# Patient Record
Sex: MALE | Race: WHITE | NOT HISPANIC OR LATINO | Employment: FULL TIME | URBAN - METROPOLITAN AREA
[De-identification: names, ages, dates, MRNs, and addresses within clinical notes are randomized per-mention and may not be internally consistent; named-entity substitution may affect disease eponyms.]

---

## 2017-06-29 ENCOUNTER — HOSPITAL ENCOUNTER (OUTPATIENT)
Dept: LAB | Facility: MEDICAL CENTER | Age: 62
End: 2017-06-29
Payer: COMMERCIAL

## 2017-06-29 LAB
BDY FAT % MEASURED: 30.5 %
BP DIAS: 107 MMHG
BP SYS: 139 MMHG
CHOLEST SERPL-MCNC: 144 MG/DL (ref 100–199)
DIABETES HTDIA: NO
EVENT NAME HTEVT: NORMAL
FASTING HTFAS: YES
GLUCOSE SERPL-MCNC: 87 MG/DL (ref 65–99)
HDLC SERPL-MCNC: 38 MG/DL
HYPERTENSION HTHYP: YES
LDLC SERPL CALC-MCNC: 74 MG/DL
SCREENING LOC CITY HTCIT: NORMAL
SCREENING LOC STATE HTSTA: NORMAL
SCREENING LOCATION HTLOC: NORMAL
SMOKING HTSMO: NO
SUBSCRIBER ID HTSID: NORMAL
TRIGL SERPL-MCNC: 159 MG/DL (ref 0–149)

## 2017-06-29 PROCEDURE — S5190 WELLNESS ASSESSMENT BY NONPH: HCPCS

## 2017-06-29 PROCEDURE — 80061 LIPID PANEL: CPT

## 2017-06-29 PROCEDURE — 82947 ASSAY GLUCOSE BLOOD QUANT: CPT

## 2017-06-29 PROCEDURE — 36415 COLL VENOUS BLD VENIPUNCTURE: CPT

## 2017-09-14 ENCOUNTER — EH NON-PROVIDER (OUTPATIENT)
Dept: OCCUPATIONAL MEDICINE | Facility: CLINIC | Age: 62
End: 2017-09-14

## 2017-09-14 DIAGNOSIS — Z23 FLU VACCINE NEED: Primary | ICD-10-CM

## 2017-09-14 PROCEDURE — 90686 IIV4 VACC NO PRSV 0.5 ML IM: CPT | Performed by: PREVENTIVE MEDICINE

## 2018-08-02 ENCOUNTER — HOSPITAL ENCOUNTER (OUTPATIENT)
Dept: LAB | Facility: MEDICAL CENTER | Age: 63
End: 2018-08-02
Payer: COMMERCIAL

## 2018-08-02 LAB
BDY FAT % MEASURED: 33.6 %
BP DIAS: 93 MMHG
BP SYS: 137 MMHG
CHOLEST SERPL-MCNC: 161 MG/DL (ref 100–199)
DIABETES HTDIA: NO
EVENT NAME HTEVT: NORMAL
FASTING HTFAS: YES
GLUCOSE SERPL-MCNC: 96 MG/DL (ref 65–99)
HDLC SERPL-MCNC: 39 MG/DL
HYPERTENSION HTHYP: YES
LDLC SERPL CALC-MCNC: 92 MG/DL
SCREENING LOC CITY HTCIT: NORMAL
SCREENING LOC STATE HTSTA: NORMAL
SCREENING LOCATION HTLOC: NORMAL
SMOKING HTSMO: NO
SUBSCRIBER ID HTSID: NORMAL
TRIGL SERPL-MCNC: 148 MG/DL (ref 0–149)

## 2018-08-02 PROCEDURE — 36415 COLL VENOUS BLD VENIPUNCTURE: CPT

## 2018-08-02 PROCEDURE — 80061 LIPID PANEL: CPT

## 2018-08-02 PROCEDURE — S5190 WELLNESS ASSESSMENT BY NONPH: HCPCS

## 2018-08-02 PROCEDURE — 82947 ASSAY GLUCOSE BLOOD QUANT: CPT

## 2018-10-04 ENCOUNTER — IMMUNIZATION (OUTPATIENT)
Dept: SOCIAL WORK | Facility: CLINIC | Age: 63
End: 2018-10-04

## 2018-10-04 DIAGNOSIS — Z23 NEED FOR VACCINATION: ICD-10-CM

## 2018-10-04 PROCEDURE — 90686 IIV4 VACC NO PRSV 0.5 ML IM: CPT | Performed by: REGISTERED NURSE

## 2018-11-01 ENCOUNTER — OFFICE VISIT (OUTPATIENT)
Dept: URGENT CARE | Facility: MEDICAL CENTER | Age: 63
End: 2018-11-01
Payer: COMMERCIAL

## 2018-11-01 VITALS
HEART RATE: 65 BPM | OXYGEN SATURATION: 95 % | DIASTOLIC BLOOD PRESSURE: 82 MMHG | WEIGHT: 247.6 LBS | HEIGHT: 74 IN | TEMPERATURE: 97.6 F | SYSTOLIC BLOOD PRESSURE: 158 MMHG | BODY MASS INDEX: 31.78 KG/M2

## 2018-11-01 DIAGNOSIS — R21 RASH: ICD-10-CM

## 2018-11-01 PROCEDURE — 99214 OFFICE O/P EST MOD 30 MIN: CPT | Performed by: FAMILY MEDICINE

## 2018-11-01 RX ORDER — VALACYCLOVIR HYDROCHLORIDE 1 G/1
1000 TABLET, FILM COATED ORAL 3 TIMES DAILY
Qty: 21 TAB | Refills: 1 | Status: SHIPPED | OUTPATIENT
Start: 2018-11-01 | End: 2018-11-08

## 2018-11-01 RX ORDER — ACYCLOVIR 800 MG/1
800 TABLET ORAL 2 TIMES DAILY
COMMUNITY
End: 2018-11-01

## 2018-11-01 NOTE — PROGRESS NOTES
"Subjective:      Tank Morse is a 63 y.o. male who presents with Herpes Zoster (middle lip, 2 hours ago)    - This is a very pleasant, well and non-toxic appearing 63 y.o. male with complaints of \"shingles\" attack coming on. Says he gets them once a year. Usually on upper lip but says it may spread to whole side face sometimes.           ALLERGIES:  Patient has no allergy information on record.     PMH:  No past medical history on file.     MEDS:    Current Outpatient Prescriptions:   •  valacyclovir (VALTREX) 1 GM Tab, Take 1 Tab by mouth 3 times a day for 7 days., Disp: 21 Tab, Rfl: 1  •  simvastatin (ZOCOR) 10 MG Tab, Take 10 mg by mouth every evening., Disp: , Rfl:   •  losartan (COZAAR) 25 MG Tab, Take 25 mg by mouth every day., Disp: , Rfl:     ** I have documented what I find to be significant in regards to past medical, social, family and surgical history  in my HPI or under PMH/PSH/FH review section, otherwise it is contributory **             HPI    Review of Systems   All other systems reviewed and are negative.         Objective:     /82   Pulse 65   Temp 36.4 °C (97.6 °F)   Ht 1.88 m (6' 2\")   Wt 112.3 kg (247 lb 9.6 oz)   SpO2 95%   BMI 31.79 kg/m²      Physical Exam   Constitutional: He appears well-developed. No distress.   HENT:   Head: Normocephalic and atraumatic.   Mouth/Throat: Oropharynx is clear and moist.   Eyes: Conjunctivae are normal.   Neck: Neck supple.   Cardiovascular: Regular rhythm.    No murmur heard.  Pulmonary/Chest: Effort normal. No respiratory distress.   Neurological: He is alert. He exhibits normal muscle tone.   Skin: Skin is warm and dry.   Psychiatric: He has a normal mood and affect. Judgment normal.   Nursing note and vitals reviewed.  Upper lip w/ small vesicle              Assessment/Plan:         1. Rash  valacyclovir (VALTREX) 1 GM Tab             Dx & d/c instructions discussed w/ patient and/or family members.     ER precautions (worsening " signs symptoms and when to go to ER) discussed.    Follow up w/ PCP in 2-3 days to make sure symptoms improving and no further intervention/treatment and/or work-up needed was advised, ER if feeling worse or not improving in 2 days.    Possible side effects (i.e. Rash, GI upset/constipation, sedation, elevation of BP or sugars) of any medications given discussed.     Patient left in stable condition

## 2018-12-18 ENCOUNTER — HOSPITAL ENCOUNTER (OUTPATIENT)
Dept: LAB | Facility: MEDICAL CENTER | Age: 63
End: 2018-12-18
Attending: NURSE PRACTITIONER
Payer: COMMERCIAL

## 2018-12-18 LAB
ALBUMIN SERPL BCP-MCNC: 4.3 G/DL (ref 3.2–4.9)
ALBUMIN/GLOB SERPL: 1.8 G/DL
ALP SERPL-CCNC: 124 U/L (ref 30–99)
ALT SERPL-CCNC: 18 U/L (ref 2–50)
ANION GAP SERPL CALC-SCNC: 8 MMOL/L (ref 0–11.9)
AST SERPL-CCNC: 16 U/L (ref 12–45)
BASOPHILS # BLD AUTO: 1 % (ref 0–1.8)
BASOPHILS # BLD: 0.05 K/UL (ref 0–0.12)
BILIRUB SERPL-MCNC: 1.4 MG/DL (ref 0.1–1.5)
BUN SERPL-MCNC: 16 MG/DL (ref 8–22)
CALCIUM SERPL-MCNC: 9.8 MG/DL (ref 8.5–10.5)
CHLORIDE SERPL-SCNC: 105 MMOL/L (ref 96–112)
CHOLEST SERPL-MCNC: 187 MG/DL (ref 100–199)
CO2 SERPL-SCNC: 28 MMOL/L (ref 20–33)
CREAT SERPL-MCNC: 1.13 MG/DL (ref 0.5–1.4)
EOSINOPHIL # BLD AUTO: 0.13 K/UL (ref 0–0.51)
EOSINOPHIL NFR BLD: 2.5 % (ref 0–6.9)
ERYTHROCYTE [DISTWIDTH] IN BLOOD BY AUTOMATED COUNT: 41.1 FL (ref 35.9–50)
GLOBULIN SER CALC-MCNC: 2.4 G/DL (ref 1.9–3.5)
GLUCOSE SERPL-MCNC: 100 MG/DL (ref 65–99)
HCT VFR BLD AUTO: 47.4 % (ref 42–52)
HDLC SERPL-MCNC: 45 MG/DL
HGB BLD-MCNC: 16.3 G/DL (ref 14–18)
IMM GRANULOCYTES # BLD AUTO: 0.01 K/UL (ref 0–0.11)
IMM GRANULOCYTES NFR BLD AUTO: 0.2 % (ref 0–0.9)
LDLC SERPL CALC-MCNC: 115 MG/DL
LYMPHOCYTES # BLD AUTO: 1.18 K/UL (ref 1–4.8)
LYMPHOCYTES NFR BLD: 22.4 % (ref 22–41)
MCH RBC QN AUTO: 31.2 PG (ref 27–33)
MCHC RBC AUTO-ENTMCNC: 34.4 G/DL (ref 33.7–35.3)
MCV RBC AUTO: 90.8 FL (ref 81.4–97.8)
MONOCYTES # BLD AUTO: 0.48 K/UL (ref 0–0.85)
MONOCYTES NFR BLD AUTO: 9.1 % (ref 0–13.4)
NEUTROPHILS # BLD AUTO: 3.41 K/UL (ref 1.82–7.42)
NEUTROPHILS NFR BLD: 64.8 % (ref 44–72)
NRBC # BLD AUTO: 0 K/UL
NRBC BLD-RTO: 0 /100 WBC
PLATELET # BLD AUTO: 225 K/UL (ref 164–446)
PMV BLD AUTO: 10 FL (ref 9–12.9)
POTASSIUM SERPL-SCNC: 4 MMOL/L (ref 3.6–5.5)
PROT SERPL-MCNC: 6.7 G/DL (ref 6–8.2)
RBC # BLD AUTO: 5.22 M/UL (ref 4.7–6.1)
SODIUM SERPL-SCNC: 141 MMOL/L (ref 135–145)
TRIGL SERPL-MCNC: 137 MG/DL (ref 0–149)
TSH SERPL DL<=0.005 MIU/L-ACNC: 1.7 UIU/ML (ref 0.38–5.33)
WBC # BLD AUTO: 5.3 K/UL (ref 4.8–10.8)

## 2018-12-18 PROCEDURE — 84443 ASSAY THYROID STIM HORMONE: CPT

## 2018-12-18 PROCEDURE — 84153 ASSAY OF PSA TOTAL: CPT

## 2018-12-18 PROCEDURE — 85025 COMPLETE CBC W/AUTO DIFF WBC: CPT

## 2018-12-18 PROCEDURE — 36415 COLL VENOUS BLD VENIPUNCTURE: CPT

## 2018-12-18 PROCEDURE — 80061 LIPID PANEL: CPT

## 2018-12-18 PROCEDURE — 80053 COMPREHEN METABOLIC PANEL: CPT

## 2018-12-20 LAB
PSA FREE MFR SERPL: NORMAL %
PSA FREE SERPL-MCNC: NORMAL NG/ML
PSA SERPL-MCNC: 2.8 NG/ML (ref 0–4)

## 2019-06-12 ENCOUNTER — HOSPITAL ENCOUNTER (OUTPATIENT)
Dept: LAB | Facility: MEDICAL CENTER | Age: 64
End: 2019-06-12
Payer: COMMERCIAL

## 2019-06-12 LAB
BDY FAT % MEASURED: 30.7 %
BP DIAS: 96 MMHG
BP SYS: 152 MMHG
CHOLEST SERPL-MCNC: 121 MG/DL (ref 100–199)
DIABETES HTDIA: NO
EVENT NAME HTEVT: NORMAL
FASTING HTFAS: YES
GLUCOSE SERPL-MCNC: 86 MG/DL (ref 65–99)
HDLC SERPL-MCNC: 38 MG/DL
HYPERTENSION HTHYP: YES
LDLC SERPL CALC-MCNC: 65 MG/DL
SCREENING LOC CITY HTCIT: NORMAL
SCREENING LOC STATE HTSTA: NORMAL
SCREENING LOCATION HTLOC: NORMAL
SMOKING HTSMO: NO
SUBSCRIBER ID HTSID: NORMAL
TRIGL SERPL-MCNC: 91 MG/DL (ref 0–149)

## 2019-06-12 PROCEDURE — 80061 LIPID PANEL: CPT

## 2019-06-12 PROCEDURE — 36415 COLL VENOUS BLD VENIPUNCTURE: CPT

## 2019-06-12 PROCEDURE — 82947 ASSAY GLUCOSE BLOOD QUANT: CPT

## 2019-06-12 PROCEDURE — S5190 WELLNESS ASSESSMENT BY NONPH: HCPCS

## 2019-10-11 ENCOUNTER — IMMUNIZATION (OUTPATIENT)
Dept: SOCIAL WORK | Facility: CLINIC | Age: 64
End: 2019-10-11

## 2019-10-11 DIAGNOSIS — Z23 NEED FOR VACCINATION: ICD-10-CM

## 2019-10-11 PROCEDURE — 90686 IIV4 VACC NO PRSV 0.5 ML IM: CPT | Performed by: REGISTERED NURSE

## 2019-12-17 ENCOUNTER — HOSPITAL ENCOUNTER (OUTPATIENT)
Dept: LAB | Facility: MEDICAL CENTER | Age: 64
End: 2019-12-17
Attending: NURSE PRACTITIONER
Payer: COMMERCIAL

## 2019-12-17 LAB
ALBUMIN SERPL BCP-MCNC: 4.1 G/DL (ref 3.2–4.9)
ALBUMIN/GLOB SERPL: 1.5 G/DL
ALP SERPL-CCNC: 100 U/L (ref 30–99)
ALT SERPL-CCNC: 17 U/L (ref 2–50)
ANION GAP SERPL CALC-SCNC: 6 MMOL/L (ref 0–11.9)
AST SERPL-CCNC: 18 U/L (ref 12–45)
BASOPHILS # BLD AUTO: 0.8 % (ref 0–1.8)
BASOPHILS # BLD: 0.06 K/UL (ref 0–0.12)
BILIRUB SERPL-MCNC: 1.3 MG/DL (ref 0.1–1.5)
BUN SERPL-MCNC: 18 MG/DL (ref 8–22)
CALCIUM SERPL-MCNC: 9 MG/DL (ref 8.5–10.5)
CHLORIDE SERPL-SCNC: 107 MMOL/L (ref 96–112)
CHOLEST SERPL-MCNC: 170 MG/DL (ref 100–199)
CO2 SERPL-SCNC: 30 MMOL/L (ref 20–33)
CREAT SERPL-MCNC: 1.02 MG/DL (ref 0.5–1.4)
EOSINOPHIL # BLD AUTO: 0.17 K/UL (ref 0–0.51)
EOSINOPHIL NFR BLD: 2.3 % (ref 0–6.9)
ERYTHROCYTE [DISTWIDTH] IN BLOOD BY AUTOMATED COUNT: 42.3 FL (ref 35.9–50)
FASTING STATUS PATIENT QL REPORTED: NORMAL
GLOBULIN SER CALC-MCNC: 2.8 G/DL (ref 1.9–3.5)
GLUCOSE SERPL-MCNC: 92 MG/DL (ref 65–99)
HCT VFR BLD AUTO: 46.7 % (ref 42–52)
HDLC SERPL-MCNC: 46 MG/DL
HGB BLD-MCNC: 15.7 G/DL (ref 14–18)
IMM GRANULOCYTES # BLD AUTO: 0.01 K/UL (ref 0–0.11)
IMM GRANULOCYTES NFR BLD AUTO: 0.1 % (ref 0–0.9)
LDLC SERPL CALC-MCNC: 99 MG/DL
LYMPHOCYTES # BLD AUTO: 1.11 K/UL (ref 1–4.8)
LYMPHOCYTES NFR BLD: 14.7 % (ref 22–41)
MCH RBC QN AUTO: 31.3 PG (ref 27–33)
MCHC RBC AUTO-ENTMCNC: 33.6 G/DL (ref 33.7–35.3)
MCV RBC AUTO: 93 FL (ref 81.4–97.8)
MONOCYTES # BLD AUTO: 0.46 K/UL (ref 0–0.85)
MONOCYTES NFR BLD AUTO: 6.1 % (ref 0–13.4)
NEUTROPHILS # BLD AUTO: 5.74 K/UL (ref 1.82–7.42)
NEUTROPHILS NFR BLD: 76 % (ref 44–72)
NRBC # BLD AUTO: 0 K/UL
NRBC BLD-RTO: 0 /100 WBC
PLATELET # BLD AUTO: 194 K/UL (ref 164–446)
PMV BLD AUTO: 10.3 FL (ref 9–12.9)
POTASSIUM SERPL-SCNC: 4.1 MMOL/L (ref 3.6–5.5)
PROT SERPL-MCNC: 6.9 G/DL (ref 6–8.2)
RBC # BLD AUTO: 5.02 M/UL (ref 4.7–6.1)
SODIUM SERPL-SCNC: 143 MMOL/L (ref 135–145)
TRIGL SERPL-MCNC: 126 MG/DL (ref 0–149)
WBC # BLD AUTO: 7.6 K/UL (ref 4.8–10.8)

## 2019-12-17 PROCEDURE — 80053 COMPREHEN METABOLIC PANEL: CPT

## 2019-12-17 PROCEDURE — 84443 ASSAY THYROID STIM HORMONE: CPT

## 2019-12-17 PROCEDURE — 36415 COLL VENOUS BLD VENIPUNCTURE: CPT

## 2019-12-17 PROCEDURE — 80061 LIPID PANEL: CPT

## 2019-12-17 PROCEDURE — 85025 COMPLETE CBC W/AUTO DIFF WBC: CPT

## 2019-12-17 PROCEDURE — 84153 ASSAY OF PSA TOTAL: CPT

## 2019-12-18 LAB
PSA SERPL-MCNC: 3.07 NG/ML (ref 0–4)
TSH SERPL DL<=0.005 MIU/L-ACNC: 1.85 UIU/ML (ref 0.38–5.33)

## 2020-06-12 ENCOUNTER — HOSPITAL ENCOUNTER (OUTPATIENT)
Dept: LAB | Facility: MEDICAL CENTER | Age: 65
End: 2020-06-12
Payer: COMMERCIAL

## 2020-06-12 LAB
BDY FAT % MEASURED: 29.9 %
BP DIAS: 96 MMHG
BP SYS: 141 MMHG
CHOLEST SERPL-MCNC: 165 MG/DL (ref 100–199)
DIABETES HTDIA: NO
EVENT NAME HTEVT: NORMAL
FASTING HTFAS: YES
GLUCOSE SERPL-MCNC: 97 MG/DL (ref 65–99)
HDLC SERPL-MCNC: 47 MG/DL
HYPERTENSION HTHYP: YES
LDLC SERPL CALC-MCNC: 93 MG/DL
SCREENING LOC CITY HTCIT: NORMAL
SCREENING LOC STATE HTSTA: NORMAL
SCREENING LOCATION HTLOC: NORMAL
SMOKING HTSMO: NO
SUBSCRIBER ID HTSID: NORMAL
TRIGL SERPL-MCNC: 124 MG/DL (ref 0–149)

## 2020-06-12 PROCEDURE — 80061 LIPID PANEL: CPT

## 2020-06-12 PROCEDURE — 36415 COLL VENOUS BLD VENIPUNCTURE: CPT

## 2020-06-12 PROCEDURE — S5190 WELLNESS ASSESSMENT BY NONPH: HCPCS

## 2020-06-12 PROCEDURE — 82947 ASSAY GLUCOSE BLOOD QUANT: CPT

## 2020-09-16 ENCOUNTER — IMMUNIZATION (OUTPATIENT)
Dept: SOCIAL WORK | Facility: CLINIC | Age: 65
End: 2020-09-16
Payer: COMMERCIAL

## 2020-09-16 DIAGNOSIS — Z23 NEED FOR VACCINATION: ICD-10-CM

## 2020-09-16 PROCEDURE — 90686 IIV4 VACC NO PRSV 0.5 ML IM: CPT | Performed by: REGISTERED NURSE

## 2020-12-18 DIAGNOSIS — Z23 NEED FOR VACCINATION: ICD-10-CM

## 2020-12-20 ENCOUNTER — IMMUNIZATION (OUTPATIENT)
Dept: FAMILY PLANNING/WOMEN'S HEALTH CLINIC | Facility: IMMUNIZATION CENTER | Age: 65
End: 2020-12-20
Payer: COMMERCIAL

## 2020-12-20 ENCOUNTER — APPOINTMENT (OUTPATIENT)
Dept: FAMILY PLANNING/WOMEN'S HEALTH CLINIC | Facility: IMMUNIZATION CENTER | Age: 65
End: 2020-12-20
Attending: FAMILY MEDICINE
Payer: COMMERCIAL

## 2020-12-20 DIAGNOSIS — Z23 ENCOUNTER FOR VACCINATION: Primary | ICD-10-CM

## 2020-12-20 DIAGNOSIS — Z23 NEED FOR VACCINATION: ICD-10-CM

## 2020-12-20 PROCEDURE — 0001A PFIZER SARS-COV-2 VACCINE: CPT

## 2020-12-20 PROCEDURE — 91300 PFIZER SARS-COV-2 VACCINE: CPT

## 2021-01-10 ENCOUNTER — IMMUNIZATION (OUTPATIENT)
Dept: FAMILY PLANNING/WOMEN'S HEALTH CLINIC | Facility: IMMUNIZATION CENTER | Age: 66
End: 2021-01-10
Attending: FAMILY MEDICINE
Payer: COMMERCIAL

## 2021-01-10 DIAGNOSIS — Z23 ENCOUNTER FOR VACCINATION: Primary | ICD-10-CM

## 2021-01-10 PROCEDURE — 0002A PFIZER SARS-COV-2 VACCINE: CPT

## 2021-01-10 PROCEDURE — 91300 PFIZER SARS-COV-2 VACCINE: CPT

## 2021-03-03 ENCOUNTER — HOSPITAL ENCOUNTER (OUTPATIENT)
Dept: LAB | Facility: MEDICAL CENTER | Age: 66
End: 2021-03-03
Attending: NURSE PRACTITIONER
Payer: COMMERCIAL

## 2021-03-03 PROCEDURE — 36415 COLL VENOUS BLD VENIPUNCTURE: CPT

## 2021-03-03 PROCEDURE — 80061 LIPID PANEL: CPT

## 2021-03-03 PROCEDURE — 80053 COMPREHEN METABOLIC PANEL: CPT

## 2021-03-04 LAB
ALBUMIN SERPL BCP-MCNC: 4.3 G/DL (ref 3.2–4.9)
ALBUMIN/GLOB SERPL: 1.6 G/DL
ALP SERPL-CCNC: 124 U/L (ref 30–99)
ALT SERPL-CCNC: 20 U/L (ref 2–50)
ANION GAP SERPL CALC-SCNC: 10 MMOL/L (ref 7–16)
AST SERPL-CCNC: 20 U/L (ref 12–45)
BILIRUB SERPL-MCNC: 1.1 MG/DL (ref 0.1–1.5)
BUN SERPL-MCNC: 16 MG/DL (ref 8–22)
CALCIUM SERPL-MCNC: 9.6 MG/DL (ref 8.5–10.5)
CHLORIDE SERPL-SCNC: 101 MMOL/L (ref 96–112)
CHOLEST SERPL-MCNC: 156 MG/DL (ref 100–199)
CO2 SERPL-SCNC: 28 MMOL/L (ref 20–33)
CREAT SERPL-MCNC: 0.97 MG/DL (ref 0.5–1.4)
FASTING STATUS PATIENT QL REPORTED: NORMAL
GLOBULIN SER CALC-MCNC: 2.7 G/DL (ref 1.9–3.5)
GLUCOSE SERPL-MCNC: 97 MG/DL (ref 65–99)
HDLC SERPL-MCNC: 43 MG/DL
LDLC SERPL CALC-MCNC: 89 MG/DL
POTASSIUM SERPL-SCNC: 3.8 MMOL/L (ref 3.6–5.5)
PROT SERPL-MCNC: 7 G/DL (ref 6–8.2)
SODIUM SERPL-SCNC: 139 MMOL/L (ref 135–145)
TRIGL SERPL-MCNC: 121 MG/DL (ref 0–149)

## 2021-10-01 ENCOUNTER — IMMUNIZATION (OUTPATIENT)
Dept: OCCUPATIONAL MEDICINE | Facility: CLINIC | Age: 66
End: 2021-10-01
Payer: COMMERCIAL

## 2021-10-01 DIAGNOSIS — Z23 ENCOUNTER FOR VACCINATION: Primary | ICD-10-CM

## 2021-10-01 DIAGNOSIS — Z23 NEED FOR VACCINATION: Primary | ICD-10-CM

## 2021-10-01 PROCEDURE — 0003A PFIZER SARS-COV-2 VACCINE: CPT | Performed by: INTERNAL MEDICINE

## 2021-10-01 PROCEDURE — 90686 IIV4 VACC NO PRSV 0.5 ML IM: CPT | Performed by: NURSE PRACTITIONER

## 2021-10-01 PROCEDURE — 91300 PFIZER SARS-COV-2 VACCINE: CPT | Performed by: INTERNAL MEDICINE

## 2022-05-03 ENCOUNTER — OFFICE VISIT (OUTPATIENT)
Dept: MEDICAL GROUP | Facility: LAB | Age: 67
End: 2022-05-03
Payer: COMMERCIAL

## 2022-05-03 VITALS
DIASTOLIC BLOOD PRESSURE: 82 MMHG | BODY MASS INDEX: 31.65 KG/M2 | OXYGEN SATURATION: 96 % | SYSTOLIC BLOOD PRESSURE: 140 MMHG | TEMPERATURE: 97 F | RESPIRATION RATE: 14 BRPM | HEART RATE: 76 BPM | HEIGHT: 74 IN | WEIGHT: 246.6 LBS

## 2022-05-03 DIAGNOSIS — B00.1 RECURRENT COLD SORES: ICD-10-CM

## 2022-05-03 DIAGNOSIS — Z11.59 NEED FOR HEPATITIS C SCREENING TEST: ICD-10-CM

## 2022-05-03 DIAGNOSIS — Z23 NEED FOR VACCINATION: ICD-10-CM

## 2022-05-03 DIAGNOSIS — E78.5 HYPERLIPIDEMIA, UNSPECIFIED HYPERLIPIDEMIA TYPE: ICD-10-CM

## 2022-05-03 DIAGNOSIS — Z12.11 SCREENING FOR COLORECTAL CANCER: ICD-10-CM

## 2022-05-03 DIAGNOSIS — R06.83 SNORING: ICD-10-CM

## 2022-05-03 DIAGNOSIS — Z12.12 SCREENING FOR COLORECTAL CANCER: ICD-10-CM

## 2022-05-03 DIAGNOSIS — E55.9 VITAMIN D DEFICIENCY: ICD-10-CM

## 2022-05-03 DIAGNOSIS — I10 ESSENTIAL HYPERTENSION: ICD-10-CM

## 2022-05-03 PROCEDURE — 90471 IMMUNIZATION ADMIN: CPT | Performed by: NURSE PRACTITIONER

## 2022-05-03 PROCEDURE — 90715 TDAP VACCINE 7 YRS/> IM: CPT | Performed by: NURSE PRACTITIONER

## 2022-05-03 PROCEDURE — 99214 OFFICE O/P EST MOD 30 MIN: CPT | Mod: 25 | Performed by: NURSE PRACTITIONER

## 2022-05-03 RX ORDER — SIMVASTATIN 40 MG
40 TABLET ORAL NIGHTLY
Qty: 90 TABLET | Refills: 3 | Status: SHIPPED | OUTPATIENT
Start: 2022-05-03 | End: 2023-05-16

## 2022-05-03 RX ORDER — LOSARTAN POTASSIUM 25 MG/1
25 TABLET ORAL
Qty: 180 TABLET | Refills: 3 | Status: SHIPPED | OUTPATIENT
Start: 2022-05-03 | End: 2023-05-01

## 2022-05-03 RX ORDER — AMOXICILLIN 875 MG/1
TABLET, COATED ORAL
COMMUNITY
Start: 2022-04-29

## 2022-05-03 RX ORDER — ACYCLOVIR 800 MG/1
800 TABLET ORAL 2 TIMES DAILY
Qty: 20 TABLET | Refills: 3 | Status: SHIPPED | OUTPATIENT
Start: 2022-05-03

## 2022-05-03 RX ORDER — TRIAMCINOLONE ACETONIDE 5 MG/G
CREAM TOPICAL 3 TIMES DAILY
COMMUNITY
End: 2023-10-18 | Stop reason: SDUPTHER

## 2022-05-03 RX ORDER — ACYCLOVIR 800 MG/1
800 TABLET ORAL 2 TIMES DAILY
COMMUNITY
End: 2022-05-03 | Stop reason: SDUPTHER

## 2022-05-03 ASSESSMENT — PATIENT HEALTH QUESTIONNAIRE - PHQ9: CLINICAL INTERPRETATION OF PHQ2 SCORE: 0

## 2022-05-03 NOTE — ASSESSMENT & PLAN NOTE
This is a chronic condition. Curently taking losartan 25 mg BID, denies side effects. Does not check blood pressure at home. Today's reading is 140/82. Denies chest pain, shortness of breath, headaches, dizziness/lightheadedness.

## 2022-05-03 NOTE — PROGRESS NOTES
"Subjective:     CC:    Chief Complaint   Patient presents with   • Establish Care   • Medication Refill     HISTORY OF THE PRESENT ILLNESS: Patient is a 66 y.o. male. This pleasant patient is here today to establish care and discuss the following:    Essential hypertension  This is a chronic condition. Curently taking losartan 25 mg BID, denies side effects. Does not check blood pressure at home. Today's reading is 140/82. Denies chest pain, shortness of breath, headaches, dizziness/lightheadedness.    Hyperlipidemia  Doing well. Reviewed labs with the patient. Taking medications as prescribed. No myalgias.    ROS:   Gen: no fevers/chills, no changes in weight  Eyes: no changes in vision  ENT: no sore throat, no hearing loss, no bloody nose  Pulm: no sob, no cough  CV: no chest pain, no palpitations  GI: no nausea/vomiting, no diarrhea  : no dysuria  MSk: no myalgias  Skin: no rash  Neuro: no headaches, no numbness/tingling  Heme/Lymph: no easy bruising        - NOTE: All other systems reviewed and are negative, except as in HPI.      Objective:     Exam: /82 (BP Location: Right arm, Patient Position: Sitting, BP Cuff Size: Adult)   Pulse 76   Temp 36.1 °C (97 °F)   Resp 14   Ht 1.88 m (6' 2\")   Wt 112 kg (246 lb 9.6 oz)   SpO2 96%  Body mass index is 31.66 kg/m².    Constitutional: Alert, no distress, well-groomed.  Skin: Warm, dry, good turgor, no rashes in visible areas.  Eye: Equal, round and reactive, conjunctiva clear, lids normal.  ENMT: Lips without lesions, good dentition, moist mucous membranes.  Neck: Trachea midline, no masses, no thyromegaly.  Respiratory: Unlabored respiratory effort, no cough. Clear to ausculation. No rales, ronchi, or wheezing.  Cardiovascular: Regular rate and rhythm without murmur. Carotid and radial pulses are intact and equal bilaterally.  MSK: Normal gait, moves all extremities.  Neuro: Grossly non-focal.   Psych: Alert and oriented x3, normal affect and " mood.    Assessment & Plan:   66 y.o. male with the following -    1. Essential hypertension  Well-controlled on current regimen.  Labs as indicated.  Continue antihypertensive medications.  Discussed decreasing salt intake.  Emphasized benefits of exercise and diet. Continue to monitor.  - CBC WITH DIFFERENTIAL; Future  - Comp Metabolic Panel; Future  - Lipid Profile; Future  - TSH WITH REFLEX TO FT4; Future  - VITAMIN D,25 HYDROXY; Future  - losartan (COZAAR) 25 MG Tab; Take 1 Tablet by mouth 2 (two) times a day.  Dispense: 180 Tablet; Refill: 3    2. Hyperlipidemia, unspecified hyperlipidemia type  Well controlled.  Labs as indicated.  Continue statin medication and lifestyle modifications.  Continue to monitor.  - CBC WITH DIFFERENTIAL; Future  - Comp Metabolic Panel; Future  - Lipid Profile; Future  - TSH WITH REFLEX TO FT4; Future  - VITAMIN D,25 HYDROXY; Future  - simvastatin (ZOCOR) 40 MG Tab; Take 1 Tablet by mouth every evening.  Dispense: 90 Tablet; Refill: 3    3. Vitamin D deficiency  Labs ordered.   - CBC WITH DIFFERENTIAL; Future  - Comp Metabolic Panel; Future  - Lipid Profile; Future  - TSH WITH REFLEX TO FT4; Future  - VITAMIN D,25 HYDROXY; Future    4. Recurrent cold sores  Chronic, stable condition. Medication refilled for future.   - acyclovir (ZOVIRAX) 800 MG Tab; Take 1 Tablet by mouth 2 times a day.  Dispense: 20 Tablet; Refill: 3    5. Need for vaccination  Patient was agreeable to receiving the tdap vaccine in clinic today after discussion of potential risks, benefits, and side effects. Vaccine was administered without adverse effects.  - Tdap =>8yo IM    6. Need for hepatitis C screening test  Labs ordered.   - HEP C VIRUS ANTIBODY; Future    7. Snoring  Sleep study ordered.   - Polysomnography, 4 or More; Future  - Referral to Pulmonary and Sleep Medicine    8. Screening for colorectal cancer  Colonoscopy ordered.   - Referral to GI for Colonoscopy    Please note that this dictation was  created using voice recognition software. I have made every reasonable attempt to correct obvious errors, but I expect that there are errors of grammar and possibly content that I did not discover before finalizing the note.

## 2022-05-24 ENCOUNTER — HOSPITAL ENCOUNTER (OUTPATIENT)
Dept: LAB | Facility: MEDICAL CENTER | Age: 67
End: 2022-05-24
Attending: NURSE PRACTITIONER
Payer: COMMERCIAL

## 2022-05-24 DIAGNOSIS — Z11.59 NEED FOR HEPATITIS C SCREENING TEST: ICD-10-CM

## 2022-05-24 DIAGNOSIS — E55.9 VITAMIN D DEFICIENCY: ICD-10-CM

## 2022-05-24 DIAGNOSIS — I10 ESSENTIAL HYPERTENSION: ICD-10-CM

## 2022-05-24 DIAGNOSIS — E78.5 HYPERLIPIDEMIA, UNSPECIFIED HYPERLIPIDEMIA TYPE: ICD-10-CM

## 2022-05-24 LAB
ALBUMIN SERPL BCP-MCNC: 4.3 G/DL (ref 3.2–4.9)
ALBUMIN/GLOB SERPL: 1.7 G/DL
ALP SERPL-CCNC: 127 U/L (ref 30–99)
ALT SERPL-CCNC: 17 U/L (ref 2–50)
ANION GAP SERPL CALC-SCNC: 9 MMOL/L (ref 7–16)
AST SERPL-CCNC: 22 U/L (ref 12–45)
BASOPHILS # BLD AUTO: 1.4 % (ref 0–1.8)
BASOPHILS # BLD: 0.06 K/UL (ref 0–0.12)
BILIRUB SERPL-MCNC: 1.1 MG/DL (ref 0.1–1.5)
BUN SERPL-MCNC: 11 MG/DL (ref 8–22)
CALCIUM SERPL-MCNC: 9.2 MG/DL (ref 8.5–10.5)
CHLORIDE SERPL-SCNC: 106 MMOL/L (ref 96–112)
CHOLEST SERPL-MCNC: 154 MG/DL (ref 100–199)
CO2 SERPL-SCNC: 27 MMOL/L (ref 20–33)
CREAT SERPL-MCNC: 1.07 MG/DL (ref 0.5–1.4)
EOSINOPHIL # BLD AUTO: 0.2 K/UL (ref 0–0.51)
EOSINOPHIL NFR BLD: 4.6 % (ref 0–6.9)
ERYTHROCYTE [DISTWIDTH] IN BLOOD BY AUTOMATED COUNT: 40.4 FL (ref 35.9–50)
FASTING STATUS PATIENT QL REPORTED: NORMAL
GFR SERPLBLD CREATININE-BSD FMLA CKD-EPI: 77 ML/MIN/1.73 M 2
GLOBULIN SER CALC-MCNC: 2.5 G/DL (ref 1.9–3.5)
GLUCOSE SERPL-MCNC: 95 MG/DL (ref 65–99)
HCT VFR BLD AUTO: 47.7 % (ref 42–52)
HCV AB SER QL: NORMAL
HDLC SERPL-MCNC: 39 MG/DL
HGB BLD-MCNC: 16.8 G/DL (ref 14–18)
IMM GRANULOCYTES # BLD AUTO: 0.01 K/UL (ref 0–0.11)
IMM GRANULOCYTES NFR BLD AUTO: 0.2 % (ref 0–0.9)
LDLC SERPL CALC-MCNC: 83 MG/DL
LYMPHOCYTES # BLD AUTO: 1.18 K/UL (ref 1–4.8)
LYMPHOCYTES NFR BLD: 27 % (ref 22–41)
MCH RBC QN AUTO: 31.8 PG (ref 27–33)
MCHC RBC AUTO-ENTMCNC: 35.2 G/DL (ref 33.7–35.3)
MCV RBC AUTO: 90.3 FL (ref 81.4–97.8)
MONOCYTES # BLD AUTO: 0.38 K/UL (ref 0–0.85)
MONOCYTES NFR BLD AUTO: 8.7 % (ref 0–13.4)
NEUTROPHILS # BLD AUTO: 2.54 K/UL (ref 1.82–7.42)
NEUTROPHILS NFR BLD: 58.1 % (ref 44–72)
NRBC # BLD AUTO: 0 K/UL
NRBC BLD-RTO: 0 /100 WBC
PLATELET # BLD AUTO: 238 K/UL (ref 164–446)
PMV BLD AUTO: 10 FL (ref 9–12.9)
POTASSIUM SERPL-SCNC: 3.9 MMOL/L (ref 3.6–5.5)
PROT SERPL-MCNC: 6.8 G/DL (ref 6–8.2)
RBC # BLD AUTO: 5.28 M/UL (ref 4.7–6.1)
SODIUM SERPL-SCNC: 142 MMOL/L (ref 135–145)
TRIGL SERPL-MCNC: 162 MG/DL (ref 0–149)
TSH SERPL DL<=0.005 MIU/L-ACNC: 2 UIU/ML (ref 0.38–5.33)
WBC # BLD AUTO: 4.4 K/UL (ref 4.8–10.8)

## 2022-05-24 PROCEDURE — 84443 ASSAY THYROID STIM HORMONE: CPT

## 2022-05-24 PROCEDURE — 85025 COMPLETE CBC W/AUTO DIFF WBC: CPT

## 2022-05-24 PROCEDURE — 80061 LIPID PANEL: CPT

## 2022-05-24 PROCEDURE — 36415 COLL VENOUS BLD VENIPUNCTURE: CPT

## 2022-05-24 PROCEDURE — 80053 COMPREHEN METABOLIC PANEL: CPT

## 2022-05-24 PROCEDURE — 82306 VITAMIN D 25 HYDROXY: CPT

## 2022-05-24 PROCEDURE — 86803 HEPATITIS C AB TEST: CPT

## 2022-05-26 LAB — 25(OH)D3 SERPL-MCNC: 23 NG/ML (ref 30–80)

## 2022-08-01 ENCOUNTER — OFFICE VISIT (OUTPATIENT)
Dept: SLEEP MEDICINE | Facility: MEDICAL CENTER | Age: 67
End: 2022-08-01
Payer: COMMERCIAL

## 2022-08-01 VITALS
SYSTOLIC BLOOD PRESSURE: 144 MMHG | HEART RATE: 64 BPM | DIASTOLIC BLOOD PRESSURE: 90 MMHG | OXYGEN SATURATION: 95 % | BODY MASS INDEX: 31.18 KG/M2 | RESPIRATION RATE: 16 BRPM | HEIGHT: 74 IN | WEIGHT: 243 LBS

## 2022-08-01 DIAGNOSIS — R06.83 LOUD SNORING: ICD-10-CM

## 2022-08-01 DIAGNOSIS — G47.30 SLEEP DISORDER BREATHING: Primary | ICD-10-CM

## 2022-08-01 DIAGNOSIS — F51.04 CHRONIC INSOMNIA: ICD-10-CM

## 2022-08-01 PROCEDURE — 99204 OFFICE O/P NEW MOD 45 MIN: CPT | Performed by: STUDENT IN AN ORGANIZED HEALTH CARE EDUCATION/TRAINING PROGRAM

## 2022-08-01 ASSESSMENT — FIBROSIS 4 INDEX: FIB4 SCORE: 1.46

## 2022-08-01 NOTE — PROGRESS NOTES
[facility] Consult Note     Date: 8/1/2022 / Time: 8:05 AM      Thank you for requesting a sleep medicine consultation on Tank Morse at the sleep center. Presents today with the chief complaints of snoring and awakening at night with trouble getting back to sleep. He is referred by SUMAN[facility] Che  [address] [place] [address] for evaluation and treatment of sleep disorder breathing .     HISTORY OF PRESENT ILLNESS:     Tank Morse is a 67 y.o. male with hypertension, hyperlipidemia, obesity and loud snoring.  Presents to Sleep Clinic for evaluation of snoring and trouble with awakening and getting back to sleep.    States he has had trouble with snoring most of his life.  However it has become louder over the years.  States he was snoring even when he was younger and normal weight.  He has slept with a wedge pillow more recently which she does find helpful at times.    His wife does not share a bed with him.  He has no trouble falling asleep but he does have trouble with awakening and getting back to sleep.  States he will wake up on average 2 times a night.  4-5 nights a week he will have difficulty getting back to sleep after taking entire hour.  He is not sleepy during the day.  Denies any choking or gasping in sleep.  He has been told in the past that he does have pauses in his breathing.  He does not nap during the day.    As per supplemental questionnaire to be scanned or imported into chart:    Harman Sleepiness Score: 6    Sleep Schedule  Bedtime: Weekday & Weekend 930-1030pm  Wake time: Weekday & Weekend 530-6am  Sleep-onset latency: 10  Awakenings from sleep: 2  Difficulty falling back asleep: yes, 4-5 times a week has trouble   Bedroom partner: none  Naps: No     DAYTIME SYMPTOMS:   Excessive daytime sleepiness: No   Daytime fatigue: No   Difficulty concentrating: Yes  Memory problems: No   Irritability:No   Work/school performance  "issues: No   Sleepiness with driving: No   Caffeine/stimulant use: Yes coffee in day   Alcohol use:No     SLEEP RELATED SYMPTOMS  Snoring: Yes  Witnessed apnea or gasping/choking: Yes, pauses in past   Dry mouth or mouth breathing: Yes  Sweating: No   Teeth grinding/biting: No   Morning headaches: No   Refreshed Upon Awakening: Yes     SLEEP RELATED BEHAVIORS:  Parasomnias (walking, talking, eating, violence): No   Leg kicking: No   Restless legs - \"urge to move\": Yes, very rare   Nightmares: No  Recurrent: No   Dream enactment: No      NARCOLEPSY:  Cataplexy: No   Sleep paralysis: No   Sleep attacks: No   Hypnagogic/hypnopompic hallucinations: No     MEDICAL HISTORY  Past Medical History:   Diagnosis Date   • Chickenpox    • GERD (gastroesophageal reflux disease)    • Heartburn    • Hyperlipidemia    • Hypertension    • Influenza    • Mumps    • Obesity    • Ringing in ears    • Snoring    • Wears glasses         SURGICAL HISTORY  Past Surgical History:   Procedure Laterality Date   • APPENDECTOMY     • RHINOPLASTY     • SINUSCOPE          FAMILY HISTORY  Family History   Problem Relation Age of Onset   • Alzheimer's Disease Father    • Cancer Sister         Lymphoma       SOCIAL HISTORY  Social History     Socioeconomic History   • Marital status:    Tobacco Use   • Smoking status: Former Smoker     Packs/day: 1.00     Years: 16.00     Pack years: 16.00     Types: Cigarettes, Pipe, Cigars     Start date: 1979     Quit date: 4/15/1995     Years since quittin.3   • Smokeless tobacco: Never Used   Vaping Use   • Vaping Use: Never used   Substance and Sexual Activity   • Alcohol use: Not Currently   • Drug use: Never   • Sexual activity: Not Currently        Occupation:  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     • acyclovir (ZOVIRAX) 800 " "MG Tab Take 1 Tablet by mouth 2 times a day. 20 Tablet 3   • losartan (COZAAR) 25 MG Tab Take 1 Tablet by mouth 2 (two) times a day. 180 Tablet 3   • simvastatin (ZOCOR) 40 MG Tab Take 1 Tablet by mouth every evening. 90 Tablet 3   • amoxicillin (AMOXIL) 875 MG tablet  (Patient not taking: Reported on 8/1/2022)       No current facility-administered medications for this visit.       REVIEW OF SYSTEMS  Constitutional: Denies fevers, Denies weight changes  Ears/Nose/Throat/Mouth: Denies nasal congestion or sore throat   Cardiovascular: Denies chest pain  Respiratory: Denies shortness of breath, Denies cough  Gastrointestinal/Hepatic: Denies nausea, vomiting  Sleep: see HPI    Physical Examination:  Vitals/ General Appearance:   Weight/BMI: Body mass index is 31.2 kg/m².  BP (!) 144/90 (BP Location: Left arm, Patient Position: Sitting, BP Cuff Size: Adult)   Pulse 64   Resp 16   Ht 1.88 m (6' 2\")   Wt 110 kg (243 lb)   SpO2 95%   Vitals:    08/01/22 0802   BP: (!) 144/90   BP Location: Left arm   Patient Position: Sitting   BP Cuff Size: Adult   Pulse: 64   Resp: 16   SpO2: 95%   Weight: 110 kg (243 lb)   Height: 1.88 m (6' 2\")       Pt. is alert and oriented to time, place and person. Cooperative and in no apparent distress.     Constitutional: Alert, no distress, well-groomed.  Skin: No rashes in visible areas.  Eye: Round. Conjunctiva clear, lids normal. No icterus.   ENT EXAM  Nasal alae/valves collapsible: No   Nasal septum deviation: Yes  Nasal turbinate hypertrophy: Left: Grade 1   Right: Grade 1  Hard palate narrow: No   Hard palate high: Yes  Soft palate/uvula (Mallampati score): 4  Tongue Scalloping: Yes  Retrognathia: No   Micrognathia: No   Cardiovascular:no murmus/gallops/rubs, normal S1 and S2 heart sounds, regular rate and rhythm  Pulmonary:Clear to auscultation, No wheezes, No crackles.  Neurologic:Awake, alert and oriented x 3, Normal age appropriate gait  Extremities: No clubbing, cyanosis, or " edema       ASSESSMENT AND PLAN   Tank Morse is a 67 y.o. male with hypertension, hyperlipidemia, obesity and loud snoring.  Presents to Sleep Clinic for evaluation of snoring and trouble with awakening and getting back to sleep.    1. Tank Morse  has symptoms of Obstructive Sleep Apnea (STEPHAN). Tank Morse has symptoms of snoring, witnessed apnea, dry mouth, and awakening with trouble getting back to sleep.  Pt has risk factors for STEPHAN include obesity, age, and crowded oropharynx  .     The pathophysiology of STEPHAN and the increased risk of cardiovascular morbidity from untreated STEPHAN is discussed in detail with the patient. He  also has HTN, which can be worsened by STEPHAN.      We have discussed diagnostic options including in-laboratory, attended polysomnography and home sleep testing. We have also discussed treatment options including airway pressurization, reconstructive otolaryngologic surgery, dental appliances and weight management.     Subsequently,treatment options will be discussed based on the diagnostic study. Meanwhile, He is urged to avoid supine sleep, weight gain and alcoholic beverages since all of these can worsen STEPHAN. He is cautioned against drowsy driving. If He feels sleepy while driving, advised must pull over for a break/nap, rather than persist on the road, in the interest of Pt's own safety and that of others on the road.    Plan  -  He  will be scheduled for an overnight  HST to assess sleep related breathing disorder.  - Follow up 1-2 weeks after sleep study to discuss results and treatment options moving forward   -Advised to reach out via MyChart or by phone with any questions or concerns.     2.  Chronic Insomnia   With  awakenings with difficulty falling back asleep and feeling this is impacting daily functioning for years meets criteria for chronic insomnia. Discussed potential causes of insomnia and importance of having a routine around bedtime.     Suspect  his trouble with awakening and getting back to sleep is likely secondary to untreated sleep apnea.  Sleep apnea is present treating his sleep apnea will likely improve his insomnia.      Regarding treatment of other past medical problems and general health maintenance,  Pt is urged to follow up with PCP.      Please note portions of this record was created using voice recognition software. I have made every reasonable attempt to correct obvious errors, but I expect that there are errors of grammar and possibly content I did not discover before finalizing the note.

## 2022-09-21 ENCOUNTER — HOME STUDY (OUTPATIENT)
Dept: SLEEP MEDICINE | Facility: MEDICAL CENTER | Age: 67
End: 2022-09-21
Attending: STUDENT IN AN ORGANIZED HEALTH CARE EDUCATION/TRAINING PROGRAM
Payer: COMMERCIAL

## 2022-09-21 DIAGNOSIS — R06.83 LOUD SNORING: ICD-10-CM

## 2022-09-21 DIAGNOSIS — G47.30 SLEEP DISORDER BREATHING: ICD-10-CM

## 2022-09-21 PROCEDURE — 95806 SLEEP STUDY UNATT&RESP EFFT: CPT | Performed by: STUDENT IN AN ORGANIZED HEALTH CARE EDUCATION/TRAINING PROGRAM

## 2022-09-27 NOTE — PROCEDURES
DIAGNOSTIC HOME SLEEP TEST (HST) REPORT       PATIENT ID:  NAME:  Tank Morse  MRN:               [id number]  YOB: 1955  DATE OF STUDY: 9/21/2022      Impression:     This study shows evidence of:     1. Severe obstructive sleep apnea with  Respiratory Event Index (DAMON) of 42.9 per hour and worse in supine sleep with DAMON at 53.1. These findings are based on the recording time (flow evaluation time). It is not possible with this device to determine a traditional apnea+hypopnea index (AHI) for total sleep time since EEG channels are not available.     2. Oxygenation O2 Sat. grant was 62% and mean O2 sat was 69% and baseline O2 at 92 %. O2 sat was below 88% for 2hr  48 min of the flow evaluation time. Oxygen Desaturation (>=3%) Index was elevated at 42.7/hr. AVG HR was 62 BPM.      TECHNICAL DESCRIPTION: Resonant Vibes apnea link air device used was a type-III home study device. Home sleep study recording included: Airflow recording by nasal pressure transducer; Respiratory Effort by 1 RIP Band; Oxygen Saturation and heart rate by finger oximetry. A position sensor and a snore channel was also used.    Scoring Criteria: A modification of the the AASM Manual for the Scoring of Sleep and Associated Events, 2012, was used.   Obstructive apnea was scored by cessation of airflow for at least 10 seconds with continuing respiratory effort.  Central apnea was scored by cessation of airflow for at least 10 seconds with no effort.  Hypopnea was scored by a 30% or more reduction in airflow for at least 10 seconds accompanied by an arterial oxygen desaturation of 3% or more.  (For Medicare patients, hypopneas were scored by a 30% or more reduction in airflow for at least 10 seconds accompanied by an arterial oxygen saturation of 4% or more, as required by their insurance, CMS.        General sleep summary: . Total recording time is 8 hours and 1 minutes and total flow evaluation time is 7 hours and 48  minutes. The patient spent 3 hours and 4 minutes in the supine position and 4 hours and 42 minutes in the nonsupine position.    Respiratory events:    Apneas: Total 176 (Obstructive apnea index 10.3/hr, Central apnea index 12.3 /hr, mixed 0 /hour)  Hypopneas: Total 159 with a Hypopnea index of 20.4 /hr    Recommendations:    1. CPAP titration study vs Auto CPAP trial .  Given severity of sleep apnea along with elevated central apnea index and nocturnal hypoxia patient would benefit from undergoing a in lab titration study.    2.   In general patients with sleep apnea are advised to avoid alcohol and sedatives and to not operate a motor vehicle while drowsy. In some cases alternative treatment options may prove effective in resolving sleep apnea in these options include upper airway surgery, the use of a dental orthotic or weight loss and positional therapy. Clinical correlation is required.         Clinton Granados MD

## 2022-10-05 ENCOUNTER — OFFICE VISIT (OUTPATIENT)
Dept: SLEEP MEDICINE | Facility: MEDICAL CENTER | Age: 67
End: 2022-10-05
Payer: COMMERCIAL

## 2022-10-05 VITALS
DIASTOLIC BLOOD PRESSURE: 84 MMHG | SYSTOLIC BLOOD PRESSURE: 144 MMHG | OXYGEN SATURATION: 96 % | RESPIRATION RATE: 16 BRPM | HEART RATE: 85 BPM | BODY MASS INDEX: 31.18 KG/M2 | WEIGHT: 243 LBS | HEIGHT: 74 IN

## 2022-10-05 DIAGNOSIS — G47.33 OSA (OBSTRUCTIVE SLEEP APNEA): Primary | ICD-10-CM

## 2022-10-05 PROCEDURE — 99213 OFFICE O/P EST LOW 20 MIN: CPT | Performed by: STUDENT IN AN ORGANIZED HEALTH CARE EDUCATION/TRAINING PROGRAM

## 2022-10-05 ASSESSMENT — FIBROSIS 4 INDEX: FIB4 SCORE: 1.46

## 2022-10-05 NOTE — PROGRESS NOTES
[facility] Follow-up Visit    CC: Follow-up to discuss sleep study results      HPI:  Tank Morse is a 67 y.o.male  with hypertension, hyperlipidemia, obesity, snoring and severe obstructive sleep apnea.  Presents today to discuss sleep study results.    He underwent a home sleep study.  Reports night of study he did not have any trouble sleeping.  He did not see the results prior to today's visit.    At last visit it was discussed he has snored majority of his life.  He does have a history of being told he stops breathing in his sleep.  He does have trouble concentrating during the day.  He also has trouble with awakenings and getting back to sleep at times.    Patient Active Problem List    Diagnosis Date Noted    Essential hypertension 2021    Hyperlipidemia 2021    Vitamin D deficiency 2021       Past Medical History:   Diagnosis Date    Chickenpox     GERD (gastroesophageal reflux disease)     Heartburn     Hyperlipidemia     Hypertension     Influenza     Mumps     Obesity     Ringing in ears     Snoring     Wears glasses         Past Surgical History:   Procedure Laterality Date    APPENDECTOMY      RHINOPLASTY      SINUSCOPE         Family History   Problem Relation Age of Onset    Alzheimer's Disease Father     Cancer Sister         Lymphoma       Social History     Socioeconomic History    Marital status:      Spouse name: Not on file    Number of children: Not on file    Years of education: Not on file    Highest education level: Not on file   Occupational History    Not on file   Tobacco Use    Smoking status: Former     Packs/day: 1.00     Years: 16.00     Pack years: 16.00     Types: Cigarettes, Pipe, Cigars     Start date: 1979     Quit date: 4/15/1995     Years since quittin.4    Smokeless tobacco: Never   Vaping Use    Vaping Use: Never used   Substance and Sexual Activity    Alcohol use: Not Currently    Drug use: Never    Sexual activity: Not  "Currently   Other Topics Concern    Not on file   Social History Narrative    Not on file     Social Determinants of Health     Financial Resource Strain: Not on file   Food Insecurity: Not on file   Transportation Needs: Not on file   Physical Activity: Not on file   Stress: Not on file   Social Connections: Not on file   Intimate Partner Violence: Not on file   Housing Stability: Not on file       Current Outpatient Medications   Medication Sig Dispense Refill    amoxicillin (AMOXIL) 875 MG tablet       triamcinolone acetonide (KENALOG) 0.5 % Cream Apply  topically 3 times a day.      acyclovir (ZOVIRAX) 800 MG Tab Take 1 Tablet by mouth 2 times a day. 20 Tablet 3    losartan (COZAAR) 25 MG Tab Take 1 Tablet by mouth 2 (two) times a day. 180 Tablet 3    simvastatin (ZOCOR) 40 MG Tab Take 1 Tablet by mouth every evening. 90 Tablet 3     No current facility-administered medications for this visit.        ALLERGIES: Patient has no known allergies.    ROS  Constitutional: Denies fevers, Denies weight changes  Ears/Nose/Throat/Mouth: Denies nasal congestion or sore throat   Cardiovascular: Denies chest pain  Respiratory: Denies shortness of breath, Denies cough  Gastrointestinal/Hepatic: Denies nausea, vomiting  Sleep: see HPI      PHYSICAL EXAM  BP (!) 144/84 (BP Location: Left arm, Patient Position: Sitting, BP Cuff Size: Large adult)   Pulse 85   Resp 16   Ht 1.88 m (6' 2\")   Wt 110 kg (243 lb)   SpO2 96%   BMI 31.20 kg/m²   Appearance: Well-nourished, well-developed, no acute distress  Eyes:  No scleral icterus , EOMI  ENMT: masked  Musculoskeletal:  Grossly normal; gait and station normal; digits and nails normal  Skin:  No rashes, petechiae, cyanosis  Neurologic: without focal signs; oriented to person, time, place, and purpose; judgement intact      Medical Decision Making   Assessment and Plan  Tank Morse is a 67 y.o.male  with hypertension, hyperlipidemia, obesity, snoring and severe " obstructive sleep apnea.  Presents today to discuss sleep study results.    Obstructive sleep apnea   Reviewed recent HST with patient showing an DAMON of 42.9, with a central apnea index being slightly elevated at 12.3 events an hour, minimum oxygen saturation 62% and time at or below 88% saturation of 2 hours 48 minutes.   Based on sleep study and symptoms meets criteria for severe obstructive sleep apnea.   We discussed the pathophysiology of obstructive sleep apnea (STEPHAN) and risk factors for the disease. We also discussed possible consequences of untreated STEPHAN, including excessive daytime sleepiness and fatigue, cognitive dysfunction, cardiovascular complications such as elevated blood pressure, heart attacks, cardiac arrhythmias, and strokes. We discussed how STEPHAN typically gets worse with age. We discussed treatment options for STEPHAN, including the gold standard therapy (PAP), alternative options such as a mandibular advancement device (custom-made oral appliances) and surgeries.    Due to severity of sleep apnea along with nocturnal hypoxia it was recommended he undergo a in lab titration study.  In addition central apnea index was elevated on home study which may suggest less benefit from CPAP versus more advanced mode of PAP therapy.    RECOMMENDATIONS  -Order placed for PSG titration study  -We will plan to notify via [employer] of study results and order PAP therapy  -Patient counseled to avoid driving when sleepy. Encouraged to anticipate sleepiness, consider taking a 10 min nap prior to driving, alternate with another , or pull over if sleepy while driving  -Advised to contact our office or myself with any questions via [employer]    Positive airway pressure will favorably impact many of the adverse conditions and effects provoked by STEPHAN.    Have advised the patient to follow up with the appropriate healthcare practitioners for all other medical problems and issues.    Return in about 5 months (around  3/5/2023).      Please note portions of this record was created using voice recognition software. I have made every reasonable attempt to correct obvious errors, but I expect that there are errors of grammar and possibly content I did not discover before finalizing the note.

## 2022-10-25 ENCOUNTER — SLEEP STUDY (OUTPATIENT)
Dept: SLEEP MEDICINE | Facility: MEDICAL CENTER | Age: 67
End: 2022-10-25
Attending: STUDENT IN AN ORGANIZED HEALTH CARE EDUCATION/TRAINING PROGRAM
Payer: COMMERCIAL

## 2022-10-25 DIAGNOSIS — G47.33 OSA (OBSTRUCTIVE SLEEP APNEA): ICD-10-CM

## 2022-10-25 PROCEDURE — 95811 POLYSOM 6/>YRS CPAP 4/> PARM: CPT | Performed by: STUDENT IN AN ORGANIZED HEALTH CARE EDUCATION/TRAINING PROGRAM

## 2022-11-07 NOTE — PROCEDURES
MONTAGE: Standard  STUDY TYPE: Treatment  RECORDING TECHNIQUE:   After the scalp was prepared, gold plated electrodes were applied to the scalp according to the International 10-20 System. EEG (electroencephalogram) was continuously monitored from the O1-M2, O2-M1, C3-M2, C4-M1, F3-M2, and F4-M1. EOGs (electrooculograms) were monitored by electrodes placed at the left and right outer canthi. Chin EMG (electromyogram) was monitored by electrodes placed on the mentalis and sub-mentalis muscles. Nasal and oral airflow were monitored using a triple port thermocouple as well as oronasal pressure transducer. Respiratory effort was measured by inductive plethysmography technology employing abdominal and thoracic belts. Blood oxygen saturation and pulse were monitored by pulse oximetry. Heart rhythm was monitored by surface electrocardiogram. Leg EMG was studied using surface electrodes placed on left and right anterior tibialis. A microphone was used to monitor tracheal sounds and snoring. Body position was monitored and documented by technician observation.   SCORING CRITERIA:   A modification of the AASM manual for scoring of sleep and associated events was used. Obstructive apneas were scored by cessation of airflow for at least 10 seconds with continuing respiratory effort. Central apneas were scored by cessation of airflow for at least 10 seconds with no respiratory effort. Hypopneas were scored by a 30% or more reduction in airflow for at least 10 seconds accompanied by arterial oxygen desaturation of 3% or an arousal. For CMS (Medicare) patients, per AASM rule 1B, hypopneas are scored by 30% with mild reduction in airflow for at least 10 seconds accompanied by arterial saturation decreased at 4%.    Study start time was 09:23:04 PM. Diagnostic recording time was 8h 29.5m with a total sleep time of 7h 5.0m resulting in a sleep efficiency of 83.42%%. Sleep latency from the start of the study was 13 minutes and the  latency from sleep to REM was 93 minutes. In total,64 arousals were scored for an arousal index of 9.0.  Respiratory:  There were a total of 4 apneas consisting of 0 obstructive apneas, 0 mixed apneas, and 4 central apneas. A total of 85 hypopneas were scored. The apnea index was 0.56 per hour and the hypopnea index was 12.00 per hour resulting in an overall AHI of 12.56. AHI during rem was 9.6 and AHI while supine was 0.00.  Oximetry:  There was a mean oxygen saturation of 93.0%. The minimum oxygen saturation in NREM was 87.0 % and in REM was 83.0%. The patient spent 4.9 minutes of TST with SaO2 <88%.  Cardiac:  The highest heart rate seen while awake was 80 BPM while the highest heart rate during sleep was 125 BPM with an average sleeping heart rate of 59 BPM.  Limb Movements:  There were a total of 116 PLMs during sleep which resulted in a PLMS index of 16.4. Of these, 17 were associated with arousals which resulted in a PLMS arousal index of 2.4.  Titration:   CPAP was tried from 5 to 12cm H2O.  This was a fully attended sleep study. This test was technically adequate. This patient was titrated on CPAP starting at 5 cm of water pressure. Patient was titrated up to 12 cm of water pressure. Patient did best at 11 cm of water pressure. Patient spent 53 minutes at that pressure and their AHI was 1.1 which is considered treated obstructive sleep apnea.     Impression:  1.  Respiratory events improved with PAP  2.  No supine REM sleep was seen during night of study  3.  Obstructive sleep apnea was treated at a pressure above 8  4.  PLM's noted    Recommendations:  I recommend auto CPAP 8-13cm.  Patient used a medium Davion mask during night of study.      I also recommend 30 day compliance download to assess the efficacy to the recommended pressure, measure leak, apnea hypopnea index and compliance for further outpatient monitoring and management of CPAP therapy. In some cases alternative treatment options may be  proven effective in resolving sleep apnea. These options include upper airway surgery, the use of a dental orthotic, weight loss orpositional therapy. Clinical correlation is required. In general patients with sleep apnea are advised to avoid alcohol, sedatives and not to operate a motor vehicle while drowsy.  Untreated sleep apnea increases the risk for cardiovascular and neurovascular disease.

## 2023-01-03 ENCOUNTER — OFFICE VISIT (OUTPATIENT)
Dept: SLEEP MEDICINE | Facility: MEDICAL CENTER | Age: 68
End: 2023-01-03
Payer: COMMERCIAL

## 2023-01-03 VITALS
WEIGHT: 246 LBS | BODY MASS INDEX: 31.57 KG/M2 | SYSTOLIC BLOOD PRESSURE: 130 MMHG | HEART RATE: 70 BPM | DIASTOLIC BLOOD PRESSURE: 92 MMHG | RESPIRATION RATE: 16 BRPM | OXYGEN SATURATION: 95 % | HEIGHT: 74 IN

## 2023-01-03 DIAGNOSIS — G47.33 OSA (OBSTRUCTIVE SLEEP APNEA): Primary | ICD-10-CM

## 2023-01-03 PROCEDURE — 99213 OFFICE O/P EST LOW 20 MIN: CPT | Performed by: STUDENT IN AN ORGANIZED HEALTH CARE EDUCATION/TRAINING PROGRAM

## 2023-01-03 ASSESSMENT — FIBROSIS 4 INDEX: FIB4 SCORE: 1.46

## 2023-01-03 NOTE — PROGRESS NOTES
[facility] Follow-up Visit    CC: Follow-up for first compliance visit after receiving new CPAP machine      HPI:  Tank Morse is a 67 y.o.male  with hyperlipidemia, hypertension, obesity, snoring and severe obstructive sleep apnea on CPAP.  Presents to sleep clinic to follow-up to discuss first compliance visit after receiving CPAP machine.    He states he continues to use his CPAP machine regularly.  He is finding with using his CPAP machine he is feeling more rested.  His sleep appears to be more restorative.  With less disruptions.  Overall finds the pressures comfortable.  He is having some nasal irritation from the mask.  He is using a hybrid style fullface mask.  He is finding that it is difficult to get the pressure right on his nose and his nose has become sore.  He has not tried any other masks since receiving his CPAP machine.      DME provider: [facility]   Device: Dreamstation 2  Mask: Davion   Aerophagia: No   Snoring: No   Dry mouth: Yes  Leak: No   Skin irritation: Yes nose   Chin strap: No     Sleep History  9/21/2022 HST showed severe obstructive sleep apnea overall DAMON 42.9, minimum oxygen saturation 62%, time at or below 88% saturation 2 hours 48 minutes.  10/5/2022 PSG titration study showed improvement in respiratory events with CPAP therapy.  Recommended 8-13 cmH2O    Patient Active Problem List    Diagnosis Date Noted    Essential hypertension 02/09/2021    Hyperlipidemia 02/09/2021    Vitamin D deficiency 02/09/2021       Past Medical History:   Diagnosis Date    Chickenpox     GERD (gastroesophageal reflux disease)     Heartburn     Hyperlipidemia     Hypertension     Influenza     Mumps     Obesity     Ringing in ears     Snoring     Wears glasses         Past Surgical History:   Procedure Laterality Date    APPENDECTOMY      RHINOPLASTY      SINUSCOPE         Family History   Problem Relation Age of Onset    Alzheimer's Disease Father     Cancer Sister         Lymphoma        Social History     Socioeconomic History    Marital status:      Spouse name: Not on file    Number of children: Not on file    Years of education: Not on file    Highest education level: Not on file   Occupational History    Not on file   Tobacco Use    Smoking status: Former     Packs/day: 1.00     Years: 16.00     Pack years: 16.00     Types: Cigarettes, Pipe, Cigars     Start date: 1979     Quit date: 4/15/1995     Years since quittin.7    Smokeless tobacco: Never   Vaping Use    Vaping Use: Never used   Substance and Sexual Activity    Alcohol use: Not Currently    Drug use: Never    Sexual activity: Not Currently   Other Topics Concern    Not on file   Social History Narrative    Not on file     Social Determinants of Health     Financial Resource Strain: Not on file   Food Insecurity: Not on file   Transportation Needs: Not on file   Physical Activity: Not on file   Stress: Not on file   Social Connections: Not on file   Intimate Partner Violence: Not on file   Housing Stability: Not on file       Current Outpatient Medications   Medication Sig Dispense Refill    amoxicillin (AMOXIL) 875 MG tablet       triamcinolone acetonide (KENALOG) 0.5 % Cream Apply  topically 3 times a day.      acyclovir (ZOVIRAX) 800 MG Tab Take 1 Tablet by mouth 2 times a day. 20 Tablet 3    losartan (COZAAR) 25 MG Tab Take 1 Tablet by mouth 2 (two) times a day. 180 Tablet 3    simvastatin (ZOCOR) 40 MG Tab Take 1 Tablet by mouth every evening. 90 Tablet 3     No current facility-administered medications for this visit.        ALLERGIES: Patient has no known allergies.    ROS  Constitutional: Denies fevers, Denies weight changes  Ears/Nose/Throat/Mouth: Denies nasal congestion or sore throat   Cardiovascular: Denies chest pain  Respiratory: Denies shortness of breath, Denies cough  Gastrointestinal/Hepatic: Denies nausea, vomiting  Sleep: see HPI      PHYSICAL EXAM  BP (!) 130/92 (BP Location: Left arm, Patient  "Position: Sitting, BP Cuff Size: Large adult)   Pulse 70   Resp 16   Ht 1.88 m (6' 2\")   Wt 112 kg (246 lb)   SpO2 95%   BMI 31.58 kg/m²   Appearance: Well-nourished, well-developed, no acute distress  Eyes:  No scleral icterus , EOMI  ENMT: masked  Musculoskeletal:  Grossly normal; gait and station normal; digits and nails normal  Skin:  No rashes, petechiae, cyanosis  Neurologic: without focal signs; oriented to person, time, place, and purpose; judgement intact      Medical Decision Making   Assessment and Plan  Tank Morse is a 67 y.o.male  with hyperlipidemia, hypertension, obesity, snoring and severe obstructive sleep apnea on CPAP.  Presents to sleep clinic to follow-up to discuss first compliance visit after receiving CPAP machine.    The medical record was reviewed.    Obstructive sleep apnea    Compliance data reviewed showing 83% usage > 4hours in last 30  days. Average AHI 10.8 events/hour. 90-95% pressure 13 CWP. Pt continues to use and benefit from machine.   Current settings auto CPAP 8 to 13 cm water.  Patient is noticing some benefit from the PAP machine.  However is having some nasal irritation.  Discussed he would likely benefit from changing masks.  Recommended he discuss this with his DME company.  His machine is reporting elevated respiratory events.  They appear to be obstructive as machine is requiring max pressure from pressure range.  Due to this will increase pressure range to max pressure of 15 cm water.      PLAN:   -Increase pressures in office to max pressure 15 cmH2O  New pressure settings 8-15 cmH2O  -Advised to discuss with DME company about changing masks  -Advised to reach out via [employer] with questions     Has been advised to continue the current CPAP, clean equipment frequently, and get new mask and supplies as allowed by insurance and DME. Recommend an earlier appointment, if significant treatment barriers develop.    Patients with STEPHAN are at increased risk of " cardiovascular disease including coronary artery disease, systemic arterial hypertension, pulmonary arterial hypertension, cardiac arrythmias, and stroke. The patient was advised to avoid driving a motor vehicle when drowsy.    Positive airway pressure will favorably impact many of the adverse conditions and effects provoked by STEPHAN.    Have advised the patient to follow up with the appropriate healthcare practitioners for all other medical problems and issues.    Return in about 2 months (around 3/3/2023).      Please note portions of this record was created using voice recognition software. I have made every reasonable attempt to correct obvious errors, but I expect that there are errors of grammar and possibly content I did not discover before finalizing the note.

## 2023-03-03 ENCOUNTER — OFFICE VISIT (OUTPATIENT)
Dept: SLEEP MEDICINE | Facility: MEDICAL CENTER | Age: 68
End: 2023-03-03
Attending: STUDENT IN AN ORGANIZED HEALTH CARE EDUCATION/TRAINING PROGRAM
Payer: COMMERCIAL

## 2023-03-03 VITALS
HEART RATE: 80 BPM | DIASTOLIC BLOOD PRESSURE: 96 MMHG | RESPIRATION RATE: 16 BRPM | WEIGHT: 248 LBS | HEIGHT: 74 IN | BODY MASS INDEX: 31.83 KG/M2 | SYSTOLIC BLOOD PRESSURE: 154 MMHG | OXYGEN SATURATION: 95 %

## 2023-03-03 DIAGNOSIS — G47.33 OSA (OBSTRUCTIVE SLEEP APNEA): Primary | ICD-10-CM

## 2023-03-03 PROCEDURE — 99213 OFFICE O/P EST LOW 20 MIN: CPT | Performed by: STUDENT IN AN ORGANIZED HEALTH CARE EDUCATION/TRAINING PROGRAM

## 2023-03-03 PROCEDURE — 99212 OFFICE O/P EST SF 10 MIN: CPT | Performed by: STUDENT IN AN ORGANIZED HEALTH CARE EDUCATION/TRAINING PROGRAM

## 2023-03-03 ASSESSMENT — FIBROSIS 4 INDEX: FIB4 SCORE: 1.46

## 2023-03-03 NOTE — PROGRESS NOTES
[facility] Follow-up Visit    CC: Follow-up regarding management of obstructive sleep apnea      HPI:  Tank Morse is a 67 y.o.male  with hypertension, hyperlipidemia, obesity, snoring and severe obstructive sleep apnea on CPAP.  Presents today to follow-up regarding management of obstructive sleep apnea.    He continues to use his CPAP regularly.  He continues to find that with the machine he is feeling more rested.  He has noticed that his disruptions to sleep are less on the CPAP machine.  He is finding the mask and pressures comfortable.  His dry mouth has improved as well as his skin irritation.  Overall has no acute complaints.    DME provider: [employer]   Device: Double the Donation 2  Mask: Davion   Aerophagia: No   Snoring: No   Dry mouth: improved  Leak: No   Skin irritation: improved   Chin strap: No     Sleep History  9/21/2022 HST showed severe obstructive sleep apnea overall DAMON 42.9, minimum oxygen saturation 62%, time at or below 88% saturation 2 hours 48 minutes.  10/5/2022 PSG titration study showed improvement in respiratory events with CPAP therapy.  Recommended 8-13 cmH2O    Patient Active Problem List    Diagnosis Date Noted    Essential hypertension 02/09/2021    Hyperlipidemia 02/09/2021    Vitamin D deficiency 02/09/2021       Past Medical History:   Diagnosis Date    Chickenpox     GERD (gastroesophageal reflux disease)     Heartburn     Hyperlipidemia     Hypertension     Influenza     Mumps     Obesity     Ringing in ears     Snoring     Wears glasses         Past Surgical History:   Procedure Laterality Date    APPENDECTOMY      RHINOPLASTY      SINUSCOPE         Family History   Problem Relation Age of Onset    Alzheimer's Disease Father     Cancer Sister         Lymphoma       Social History     Socioeconomic History    Marital status:      Spouse name: Not on file    Number of children: Not on file    Years of education: Not on file    Highest education level: Not  "on file   Occupational History    Not on file   Tobacco Use    Smoking status: Former     Packs/day: 1.00     Years: 16.00     Pack years: 16.00     Types: Cigarettes, Pipe, Cigars     Start date: 1979     Quit date: 4/15/1995     Years since quittin.9    Smokeless tobacco: Never   Vaping Use    Vaping Use: Never used   Substance and Sexual Activity    Alcohol use: Not Currently    Drug use: Never    Sexual activity: Not Currently   Other Topics Concern    Not on file   Social History Narrative    Not on file     Social Determinants of Health     Financial Resource Strain: Not on file   Food Insecurity: Not on file   Transportation Needs: Not on file   Physical Activity: Not on file   Stress: Not on file   Social Connections: Not on file   Intimate Partner Violence: Not on file   Housing Stability: Not on file       Current Outpatient Medications   Medication Sig Dispense Refill    amoxicillin (AMOXIL) 875 MG tablet       triamcinolone acetonide (KENALOG) 0.5 % Cream Apply  topically 3 times a day.      acyclovir (ZOVIRAX) 800 MG Tab Take 1 Tablet by mouth 2 times a day. 20 Tablet 3    losartan (COZAAR) 25 MG Tab Take 1 Tablet by mouth 2 (two) times a day. 180 Tablet 3    simvastatin (ZOCOR) 40 MG Tab Take 1 Tablet by mouth every evening. 90 Tablet 3     No current facility-administered medications for this visit.        ALLERGIES: Patient has no known allergies.    ROS  Constitutional: Denies fevers, Denies weight changes  Ears/Nose/Throat/Mouth: Denies nasal congestion or sore throat   Cardiovascular: Denies chest pain  Respiratory: Denies shortness of breath, Denies cough  Gastrointestinal/Hepatic: Denies nausea, vomiting  Sleep: see HPI      PHYSICAL EXAM  BP (!) 154/96 (BP Location: Left arm, Patient Position: Sitting, BP Cuff Size: Large adult)   Pulse 80   Resp 16   Ht 1.88 m (6' 2\")   Wt 112 kg (248 lb)   SpO2 95%   BMI 31.84 kg/m²   Appearance: Well-nourished, well-developed, no acute " distress  Eyes:  No scleral icterus , EOMI  ENMT: masked  Musculoskeletal:  Grossly normal; gait and station normal; digits and nails normal  Skin:  No rashes, petechiae, cyanosis  Neurologic: without focal signs; oriented to person, time, place, and purpose; judgement intact      Medical Decision Making   Assessment and Plan  Tank Morse is a 67 y.o.male  with hypertension, hyperlipidemia, obesity, snoring and severe obstructive sleep apnea on CPAP.  Presents today to follow-up regarding management of obstructive sleep apnea.    The medical record was reviewed.    Obstructive sleep apnea  Compliance data reviewed showing 96.7% usage > 4hours in last 30  days. Average AHI 9.4 events/hour. 90-95% pressure 12.7 CWP. Pt continues to use and benefit from machine.     Current settings are auto CPAP 8-15.  AHI remains elevated.  At last visit increase max pressure.  Will increase minimum pressure from 8-10.  Advised to continue to use CPAP regularly.      PLAN:   -Order placed for mask and supplies   -Increased pressure in office today to minimum pressure of 10 cmH2O, outgoing settings 10-15  -Advised to reach out via MyChart with questions     Has been advised to continue the current CPAP, clean equipment frequently, and get new mask and supplies as allowed by insurance and DME. Recommend an earlier appointment, if significant treatment barriers develop.    Patients with STEPHAN are at increased risk of cardiovascular disease including coronary artery disease, systemic arterial hypertension, pulmonary arterial hypertension, cardiac arrythmias, and stroke. The patient was advised to avoid driving a motor vehicle when drowsy.    Positive airway pressure will favorably impact many of the adverse conditions and effects provoked by STEPHAN.    Have advised the patient to follow up with the appropriate healthcare practitioners for all other medical problems and issues.    Return in about 6 months (around  9/3/2023).      Please note portions of this record was created using voice recognition software. I have made every reasonable attempt to correct obvious errors, but I expect that there are errors of grammar and possibly content I did not discover before finalizing the note.

## 2023-03-22 ENCOUNTER — OFFICE VISIT (OUTPATIENT)
Dept: MEDICAL GROUP | Facility: LAB | Age: 68
End: 2023-03-22
Payer: COMMERCIAL

## 2023-03-22 ENCOUNTER — HOSPITAL ENCOUNTER (OUTPATIENT)
Dept: RADIOLOGY | Facility: MEDICAL CENTER | Age: 68
End: 2023-03-22
Attending: NURSE PRACTITIONER
Payer: COMMERCIAL

## 2023-03-22 VITALS
HEART RATE: 62 BPM | WEIGHT: 247.2 LBS | OXYGEN SATURATION: 98 % | BODY MASS INDEX: 31.73 KG/M2 | TEMPERATURE: 97 F | HEIGHT: 74 IN | SYSTOLIC BLOOD PRESSURE: 132 MMHG | DIASTOLIC BLOOD PRESSURE: 68 MMHG | RESPIRATION RATE: 14 BRPM

## 2023-03-22 DIAGNOSIS — Z87.891 FORMER SMOKER: ICD-10-CM

## 2023-03-22 DIAGNOSIS — M79.672 LEFT FOOT PAIN: ICD-10-CM

## 2023-03-22 DIAGNOSIS — N52.8 OTHER MALE ERECTILE DYSFUNCTION: ICD-10-CM

## 2023-03-22 PROCEDURE — 99214 OFFICE O/P EST MOD 30 MIN: CPT | Performed by: NURSE PRACTITIONER

## 2023-03-22 PROCEDURE — 73630 X-RAY EXAM OF FOOT: CPT | Mod: LT

## 2023-03-22 RX ORDER — SILDENAFIL 100 MG/1
100 TABLET, FILM COATED ORAL
Qty: 10 TABLET | Refills: 6 | Status: SHIPPED | OUTPATIENT
Start: 2023-03-22

## 2023-03-22 ASSESSMENT — FIBROSIS 4 INDEX: FIB4 SCORE: 1.46

## 2023-03-22 NOTE — ASSESSMENT & PLAN NOTE
Patient has a history of erectile dysfunction. He has trouble both obtaining and maintaining an erection. He has tried medication in the past, Cialis, but did not find that it was helpful. He would like to try Viagra at this time.

## 2023-03-22 NOTE — PROGRESS NOTES
"Subjective:     CC:   Chief Complaint   Patient presents with    Medication Refill    Follow-Up     L foot pain      HPI:   Tank presents today with the following:    Left foot pain  Onset several weeks ago. Reports that he initially thought it was his shoes, which he replaced and has noticed a mild improvement. Reports pain is on the lateral aspect of his foot near the toe. Denies injury, numbness/tingling, toe pain. Worse when he wears dress shoes.     Other male erectile dysfunction  Patient has a history of erectile dysfunction. He has trouble both obtaining and maintaining an erection. He has tried medication in the past, Cialis, but did not find that it was helpful. He would like to try Viagra at this time.    ROS:   As documented in history of present illness above    Objective:     Exam: /68 (BP Location: Right arm, Patient Position: Sitting, BP Cuff Size: Adult)   Pulse 62   Temp 36.1 °C (97 °F)   Resp 14   Ht 1.88 m (6' 2\")   Wt 112 kg (247 lb 3.2 oz)   SpO2 98%  Body mass index is 31.74 kg/m².    Constitutional: Alert, no distress, well-groomed.  Skin: Warm, dry, good turgor, no rashes in visible areas.  Eye: Equal, round and reactive, conjunctiva clear, lids normal.  ENMT: Lips without lesions, good dentition, moist mucous membranes.  Neck: Trachea midline, no masses, no thyromegaly.  Respiratory: Unlabored respiratory effort, no cough.  Left ankle/foot: No bruising, erythema. No TTP along medial malleolus or deltoid ligament.  No TTP of lateral malleolus.  TTP at 5th MTP joint. No TTP along midfoot, base of the 5th metatarsal, or toes. ROM normal. Strength 5/5. 2+ dorsalis pedis and posterior tibial.  Sensation intact and equal bilaterally  Gait: Nonantalgic, neutral stance without pes planus  Neuro: Grossly non-focal.   Psych: Alert and oriented x3, normal affect and mood.    Assessment & Plan:     67 y.o. male with the following -     1. Left foot pain  X-ray ordered. Referral " placed to orthopedics. Use over-the-counter pain reliever, such as acetaminophen (Tylenol), ibuprofen (Advil, Motrin) or naproxen (Aleve) as needed; follow package directions for dosing.  - Referral to Orthopedics  - DX-FOOT-COMPLETE 3+ LEFT; Future    2. Former smoker  US ordered.   - US-ABDOMINAL AORTA SCREEN (AAA); Future    3. Other male erectile dysfunction  This is chronic uncontrolled condition.  Patient to take medication as prescribed. Patient denies depressed mood or anxiety.  Discussed importance of tobacco cessation and avoiding heavy use of alcohol.    - sildenafil citrate (VIAGRA) 100 MG tablet; Take 1 Tablet by mouth 1 time a day as needed for Erectile Dysfunction.  Dispense: 10 Tablet; Refill: 6

## 2023-04-29 DIAGNOSIS — I10 ESSENTIAL HYPERTENSION: ICD-10-CM

## 2023-05-01 RX ORDER — LOSARTAN POTASSIUM 25 MG/1
TABLET ORAL
Qty: 180 TABLET | Refills: 3 | Status: SHIPPED
Start: 2023-05-01 | End: 2023-10-18

## 2023-05-01 NOTE — TELEPHONE ENCOUNTER
Received request via: Pharmacy    Was the patient seen in the last year in this department? Yes 03/22/23    Does the patient have an active prescription (recently filled or refills available) for medication(s) requested? No    Does the patient have prison Plus and need 100 day supply (blood pressure, diabetes and cholesterol meds only)? Patient does not have SCP

## 2023-05-16 DIAGNOSIS — E78.5 HYPERLIPIDEMIA, UNSPECIFIED HYPERLIPIDEMIA TYPE: ICD-10-CM

## 2023-05-16 RX ORDER — SIMVASTATIN 40 MG
TABLET ORAL
Qty: 90 TABLET | Refills: 3 | Status: SHIPPED | OUTPATIENT
Start: 2023-05-16 | End: 2023-07-27 | Stop reason: SDUPTHER

## 2023-05-16 NOTE — TELEPHONE ENCOUNTER
Received request via: Pharmacy    Was the patient seen in the last year in this department? Yes  3/22/23  Does the patient have an active prescription (recently filled or refills available) for medication(s) requested? No    Does the patient have snf Plus and need 100 day supply (blood pressure, diabetes and cholesterol meds only)? Medication is not for cholesterol, blood pressure or diabetes

## 2023-07-27 DIAGNOSIS — E78.5 HYPERLIPIDEMIA, UNSPECIFIED HYPERLIPIDEMIA TYPE: ICD-10-CM

## 2023-07-27 RX ORDER — SIMVASTATIN 40 MG
40 TABLET ORAL EVERY EVENING
Qty: 90 TABLET | Refills: 3 | Status: SHIPPED | OUTPATIENT
Start: 2023-07-27

## 2023-07-27 NOTE — TELEPHONE ENCOUNTER
Received request via: Patient    Was the patient seen in the last year in this department? Yes  LOV: 3/22/2023  Does the patient have an active prescription (recently filled or refills available) for medication(s) requested? No    Does the patient have detention Plus and need 100 day supply (blood pressure, diabetes and cholesterol meds only)? Patient does not have SCP

## 2023-09-07 ENCOUNTER — OFFICE VISIT (OUTPATIENT)
Dept: SLEEP MEDICINE | Facility: MEDICAL CENTER | Age: 68
End: 2023-09-07
Attending: STUDENT IN AN ORGANIZED HEALTH CARE EDUCATION/TRAINING PROGRAM
Payer: COMMERCIAL

## 2023-09-07 VITALS
RESPIRATION RATE: 18 BRPM | HEART RATE: 80 BPM | SYSTOLIC BLOOD PRESSURE: 150 MMHG | DIASTOLIC BLOOD PRESSURE: 92 MMHG | OXYGEN SATURATION: 93 % | HEIGHT: 74 IN | BODY MASS INDEX: 31.57 KG/M2 | WEIGHT: 246 LBS

## 2023-09-07 DIAGNOSIS — G47.33 OSA (OBSTRUCTIVE SLEEP APNEA): Primary | ICD-10-CM

## 2023-09-07 PROCEDURE — 3080F DIAST BP >= 90 MM HG: CPT | Performed by: STUDENT IN AN ORGANIZED HEALTH CARE EDUCATION/TRAINING PROGRAM

## 2023-09-07 PROCEDURE — 99213 OFFICE O/P EST LOW 20 MIN: CPT | Performed by: STUDENT IN AN ORGANIZED HEALTH CARE EDUCATION/TRAINING PROGRAM

## 2023-09-07 PROCEDURE — 99212 OFFICE O/P EST SF 10 MIN: CPT | Performed by: STUDENT IN AN ORGANIZED HEALTH CARE EDUCATION/TRAINING PROGRAM

## 2023-09-07 PROCEDURE — 3077F SYST BP >= 140 MM HG: CPT | Performed by: STUDENT IN AN ORGANIZED HEALTH CARE EDUCATION/TRAINING PROGRAM

## 2023-09-07 ASSESSMENT — PATIENT HEALTH QUESTIONNAIRE - PHQ9: CLINICAL INTERPRETATION OF PHQ2 SCORE: 0

## 2023-09-07 ASSESSMENT — FIBROSIS 4 INDEX: FIB4 SCORE: 1.48

## 2023-09-07 NOTE — PROGRESS NOTES
[facility] Follow-up Visit    CC: Follow-up for management of obstructive sleep apnea      HPI:  EMY COLMENARES is a 68 y.o.male  with Hyperlipidemia, hypertension, obesity, and severe obstructive sleep apnea on CPAP.  Presents today to follow-up regarding management of obstructive sleep apnea.    He continues to use a CPAP machine regularly.  He is finding that the mask sometimes irritates his nose.  Otherwise though he does use his CPAP machine nightly.  He finds the pressures comfortable.  Overall has no acute complaints.  He has not tried different mask models.    He is interested in purchasing a travel CPAP machine.    DME provider: [email or web address]   Device: NanoPowers 2   Mask: hybrid fullface   Aerophagia: No  Snoring: No   Dry mouth: No   Leak: No   Skin irritation: No   Chin strap: No     Sleep History  9/21/2022 HST showed severe obstructive sleep apnea overall DAMON 42.9, minimum oxygen saturation 62%, time at or below 88% saturation 2 hours 48 minutes.  10/5/2022 PSG titration study showed improvement in respiratory events with CPAP therapy.  Recommended 8-13 cmH2O    Patient Active Problem List    Diagnosis Date Noted    Other male erectile dysfunction 03/22/2023    Essential hypertension 02/09/2021    Hyperlipidemia 02/09/2021    Vitamin D deficiency 02/09/2021       Past Medical History:   Diagnosis Date    Chickenpox     GERD (gastroesophageal reflux disease)     Heartburn     Hyperlipidemia     Hypertension     Influenza     Mumps     Obesity     Ringing in ears     Snoring     Wears glasses         Past Surgical History:   Procedure Laterality Date    APPENDECTOMY      RHINOPLASTY      SINUSCOPE         Family History   Problem Relation Age of Onset    Alzheimer's Disease Father     Cancer Sister         Lymphoma       Social History     Socioeconomic History    Marital status:      Spouse name: Not on file    Number of children: Not on file    Years of education: Not on file     Highest education level: Not on file   Occupational History    Not on file   Tobacco Use    Smoking status: Former     Current packs/day: 0.00     Average packs/day: 1 pack/day for 16.3 years (16.3 ttl pk-yrs)     Types: Cigarettes, Pipe, Cigars     Start date: 1979     Quit date: 4/15/1995     Years since quittin.4    Smokeless tobacco: Never   Vaping Use    Vaping Use: Never used   Substance and Sexual Activity    Alcohol use: Not Currently    Drug use: Never    Sexual activity: Not Currently   Other Topics Concern    Not on file   Social History Narrative    Not on file     Social Determinants of Health     Financial Resource Strain: Not on file   Food Insecurity: Not on file   Transportation Needs: Not on file   Physical Activity: Not on file   Stress: Not on file   Social Connections: Not on file   Intimate Partner Violence: Not on file   Housing Stability: Not on file       Current Outpatient Medications   Medication Sig Dispense Refill    simvastatin (ZOCOR) 40 MG Tab Take 1 Tablet by mouth every evening. 90 Tablet 3    losartan (COZAAR) 25 MG Tab TAKE ONE TABLET BY MOUTH TWICE A  Tablet 3    sildenafil citrate (VIAGRA) 100 MG tablet Take 1 Tablet by mouth 1 time a day as needed for Erectile Dysfunction. 10 Tablet 6    amoxicillin (AMOXIL) 875 MG tablet       triamcinolone acetonide (KENALOG) 0.5 % Cream Apply  topically 3 times a day.      acyclovir (ZOVIRAX) 800 MG Tab Take 1 Tablet by mouth 2 times a day. 20 Tablet 3     No current facility-administered medications for this visit.        ALLERGIES: Patient has no known allergies.    ROS  Constitutional: Denies fevers, Denies weight changes  Ears/Nose/Throat/Mouth: Denies nasal congestion or sore throat   Cardiovascular: Denies chest pain  Respiratory: Denies shortness of breath, Denies cough  Gastrointestinal/Hepatic: Denies nausea, vomiting  Sleep: see HPI      PHYSICAL EXAM  BP (!) 150/92 (BP Location: Left arm, Patient Position: Sitting,  "BP Cuff Size: Large adult)   Pulse 80   Resp 18   Ht 1.88 m (6' 2\")   Wt 112 kg (246 lb)   SpO2 93%   BMI 31.58 kg/m²   Appearance: Well-nourished, well-developed, no acute distress  Eyes:  No scleral icterus , EOMI  Musculoskeletal:  Grossly normal; gait and station normal; digits and nails normal  Skin:  No rashes, petechiae, cyanosis  Neurologic: without focal signs; oriented to person, time, place, and purpose; judgement intact      Medical Decision Making   Assessment and Plan  EMY COLMENARES is a 68 y.o.male  with Hyperlipidemia, hypertension, obesity, and severe obstructive sleep apnea on CPAP.  Presents today to follow-up regarding management of obstructive sleep apnea.    The medical record was reviewed.    Obstructive sleep apnea  Compliance data reviewed showing 100% usage > 4hours in last 30  days. Average AHI 5.0 events/hour. Pt continues to use and benefit from machine.      Current Settings auto CPAP 10-15    Advised that he discuss with his DME company regarding trying a different mask.  Reviewed that there are different models of hybrid style fullface masks that can be top connecting which may help with tube management.    PLAN:   -Order placed for mask and supplies   -Order placed for travel CPAP machine  -Advised to reach out via MyChart with questions     Has been advised to continue the current CPAP, clean equipment frequently, and get new mask and supplies as allowed by insurance and DME. Recommend an earlier appointment, if significant treatment barriers develop.    Patients with STEPHAN are at increased risk of cardiovascular disease including coronary artery disease, systemic arterial hypertension, pulmonary arterial hypertension, cardiac arrythmias, and stroke. The patient was advised to avoid driving a motor vehicle when drowsy.    Positive airway pressure will favorably impact many of the adverse conditions and effects provoked by STEPHAN.    Have advised the patient to follow up " with the appropriate healthcare practitioners for all other medical problems and issues.    Return in about 1 year (around 9/7/2024).      Please note portions of this record was created using voice recognition software. I have made every reasonable attempt to correct obvious errors, but I expect that there are errors of grammar and possibly content I did not discover before finalizing the note.

## 2023-10-10 ENCOUNTER — APPOINTMENT (OUTPATIENT)
Dept: MEDICAL GROUP | Facility: LAB | Age: 68
End: 2023-10-10
Payer: COMMERCIAL

## 2023-10-11 ENCOUNTER — APPOINTMENT (OUTPATIENT)
Dept: MEDICAL GROUP | Facility: LAB | Age: 68
End: 2023-10-11
Payer: COMMERCIAL

## 2023-10-18 ENCOUNTER — OFFICE VISIT (OUTPATIENT)
Dept: MEDICAL GROUP | Facility: LAB | Age: 68
End: 2023-10-18
Payer: COMMERCIAL

## 2023-10-18 VITALS
SYSTOLIC BLOOD PRESSURE: 142 MMHG | RESPIRATION RATE: 14 BRPM | OXYGEN SATURATION: 96 % | BODY MASS INDEX: 30.7 KG/M2 | HEART RATE: 62 BPM | TEMPERATURE: 96.6 F | DIASTOLIC BLOOD PRESSURE: 78 MMHG | HEIGHT: 74 IN | WEIGHT: 239.2 LBS

## 2023-10-18 DIAGNOSIS — I10 ESSENTIAL HYPERTENSION: ICD-10-CM

## 2023-10-18 DIAGNOSIS — Z12.11 SCREENING FOR COLORECTAL CANCER: ICD-10-CM

## 2023-10-18 DIAGNOSIS — Z12.12 SCREENING FOR COLORECTAL CANCER: ICD-10-CM

## 2023-10-18 DIAGNOSIS — Z23 NEED FOR VACCINATION: ICD-10-CM

## 2023-10-18 DIAGNOSIS — L30.9 ECZEMA, UNSPECIFIED TYPE: ICD-10-CM

## 2023-10-18 PROCEDURE — 3078F DIAST BP <80 MM HG: CPT | Performed by: NURSE PRACTITIONER

## 2023-10-18 PROCEDURE — 90677 PCV20 VACCINE IM: CPT | Performed by: NURSE PRACTITIONER

## 2023-10-18 PROCEDURE — 90662 IIV NO PRSV INCREASED AG IM: CPT | Performed by: NURSE PRACTITIONER

## 2023-10-18 PROCEDURE — 99214 OFFICE O/P EST MOD 30 MIN: CPT | Mod: 25 | Performed by: NURSE PRACTITIONER

## 2023-10-18 PROCEDURE — 90471 IMMUNIZATION ADMIN: CPT | Performed by: NURSE PRACTITIONER

## 2023-10-18 PROCEDURE — 90472 IMMUNIZATION ADMIN EACH ADD: CPT | Performed by: NURSE PRACTITIONER

## 2023-10-18 PROCEDURE — 3077F SYST BP >= 140 MM HG: CPT | Performed by: NURSE PRACTITIONER

## 2023-10-18 RX ORDER — TRIAMCINOLONE ACETONIDE 5 MG/G
1 CREAM TOPICAL
Qty: 30 G | Refills: 3 | Status: SHIPPED | OUTPATIENT
Start: 2023-10-18

## 2023-10-18 RX ORDER — LOSARTAN POTASSIUM 50 MG/1
50 TABLET ORAL
Qty: 180 TABLET | Refills: 3 | Status: SHIPPED | OUTPATIENT
Start: 2023-10-18 | End: 2024-01-25 | Stop reason: SDUPTHER

## 2023-10-18 ASSESSMENT — FIBROSIS 4 INDEX: FIB4 SCORE: 1.48

## 2023-10-18 NOTE — PROGRESS NOTES
I saw the patient with Kira Melendez, student. I performed a physical exam and medical decision making. I reviewed and verified the documentation on 10/18/23 and agree with the content and plan as written by the student.

## 2023-10-18 NOTE — NON-PROVIDER
"Subjective:     CC:   Chief Complaint   Patient presents with    Hypertension Follow-up       HPI:   EMY presents today with the following:    Hypertension:  Patient was recently supposed to get dental work done but they were unable to complete the procedure due to his BP being 180-190/100s.     Uncontrolled. Currently taking Losartan as directed. Takes one in the morning and one in the evening. He sometimes forgets to take it, but not often, mostly on the weekends.   He is not taking baby aspirin daily.   He is monitoring BP at home. It has been running 150-180/80-90 in the morning and 120-150/70-80. He reports drinking two cups of coffee a day. Intake has been stable. He has been trying to decrease sweets and sodium in his diet.  Denies symptoms low BP: light-headed, tunnel-vision, unusual fatigue.   Denies symptoms high BP:pounding headache, visual changes, palpitations, flushed face.   Denies medicine side effects: unusual fatigue, slow heartbeat, foot/leg swelling, cough.   He took hctz in the past without success in BP control and it was D/C'd due to not being effective.     ROS:   Gen: no fevers/chills, no changes in weight  Eyes: no changes in vision  ENT: no sore throat, no hearing loss, no bloody nose  Pulm: no sob, no cough  CV: no chest pain, no palpitations  GI: no nausea/vomiting, no diarrhea  : no dysuria  MSk: no myalgias  Skin: no rash  Neuro: no headaches, no numbness/tingling  Heme/Lymph: no easy bruising        - NOTE: All other systems reviewed and are negative, except as in HPI.    Objective:     Exam: BP (!) 142/78 (BP Location: Right arm, Patient Position: Sitting, BP Cuff Size: Adult)   Pulse 62   Temp 35.9 °C (96.6 °F)   Resp 14   Ht 1.88 m (6' 2\")   Wt 109 kg (239 lb 3.2 oz)   SpO2 96%  Body mass index is 30.71 kg/m².    Constitutional: Alert, no distress, well-groomed.  Skin: Warm, dry, good turgor, no rashes in visible areas.  Eye: Equal, round and reactive, conjunctiva " clear, lids normal.  ENMT: Lips without lesions, good dentition, moist mucous membranes.  Neck: Trachea midline, no masses, no thyromegaly.  Pulmonary: Clear to ausculation.  Normal effort. No rales, ronchi, or wheezing.  Cardiovascular: Regular rate and rhythm without murmur.   MSK: Normal gait, moves all extremities.  Neuro: Grossly non-focal.   Psych: Alert and oriented x3, normal affect and mood.       Assessment & Plan:     68 y.o. male with the following -   1. Essential hypertension  - Will try increasing dose of Losartan for better BP control. Let patient know to continue monitoring home BP readings. Instructed to have patient sit upright calmly on the EOB with legs uncrossed and arm supported at heart level while having his BP taken manually at the dentist as his readings in office have been significantly less elevated than the readings at the dentist. FU in one month to evaluate BP control and tolerance. Discussed potential need for adding hctz at a subsequent visit if BP remains elevated on higher dose.  - losartan (COZAAR) 50 MG Tab; Take 1 Tablet by mouth 2 times a day.  Dispense: 180 Tablet; Refill: 3    2. Screening for colorectal cancer  - Referral to GI for Colonoscopy    3. Need for vaccination  - INFLUENZA VACCINE, HIGH DOSE (65+ ONLY)  - Pneumococcal Conjugate Vaccine 20-Valent (6 mos+)    4. Eczema, unspecified type  - This problem is chronic, stable, and monitored appropriately as needed, and is well-controlled on the current regimen. Refill prescribed per patient request.   - triamcinolone acetonide (KENALOG) 0.5 % Cream; Apply 1 Each topically 2 times a day.  Dispense: 30 g; Refill: 3

## 2023-11-21 ENCOUNTER — OFFICE VISIT (OUTPATIENT)
Dept: MEDICAL GROUP | Facility: LAB | Age: 68
End: 2023-11-21
Payer: COMMERCIAL

## 2023-11-21 VITALS
SYSTOLIC BLOOD PRESSURE: 128 MMHG | DIASTOLIC BLOOD PRESSURE: 70 MMHG | HEIGHT: 74 IN | BODY MASS INDEX: 30.93 KG/M2 | OXYGEN SATURATION: 96 % | TEMPERATURE: 96.9 F | RESPIRATION RATE: 14 BRPM | WEIGHT: 241 LBS | HEART RATE: 80 BPM

## 2023-11-21 DIAGNOSIS — I10 ESSENTIAL HYPERTENSION: ICD-10-CM

## 2023-11-21 DIAGNOSIS — E55.9 VITAMIN D DEFICIENCY: ICD-10-CM

## 2023-11-21 DIAGNOSIS — E78.2 MIXED HYPERLIPIDEMIA: ICD-10-CM

## 2023-11-21 PROCEDURE — 3078F DIAST BP <80 MM HG: CPT | Performed by: NURSE PRACTITIONER

## 2023-11-21 PROCEDURE — 99214 OFFICE O/P EST MOD 30 MIN: CPT | Performed by: NURSE PRACTITIONER

## 2023-11-21 PROCEDURE — 3074F SYST BP LT 130 MM HG: CPT | Performed by: NURSE PRACTITIONER

## 2023-11-21 RX ORDER — HYDROCHLOROTHIAZIDE 12.5 MG/1
12.5 TABLET ORAL DAILY
Qty: 30 TABLET | Refills: 6 | Status: SHIPPED | OUTPATIENT
Start: 2023-11-21

## 2023-11-21 ASSESSMENT — FIBROSIS 4 INDEX: FIB4 SCORE: 1.48

## 2023-11-21 NOTE — PROGRESS NOTES
"Subjective:     CC:   Chief Complaint   Patient presents with    Hypertension Follow-up     HPI:   EMY presents today with the following:    Essential hypertension  Chronic, uncontrolled condition. Currently taking Losartan 50 mg BID as directed. Was increased to twice daily at last appointment about a month ago.   He is not taking baby aspirin daily.   He is monitoring BP at home. Most readings are in the 160s/90s.   Denies symptoms low BP: light-headed, tunnel-vision, unusual fatigue.   Denies symptoms high BP:pounding headache, visual changes, palpitations, flushed face.   Denies medicine side effects: unusual fatigue, slow heartbeat, foot/leg swelling, cough.    Hyperlipidemia  Chronic condition. Due for labs. Patient currently taking simvastatin nightly. The 10-year ASCVD risk score (Tessa NICHOLSON, et al., 2019) is: 15.3%.  Denies chest pain, shortness of breath, heart palpitations.    Lab Results   Component Value Date/Time    CHOLSTRLTOT 154 05/24/2022 07:56 AM    LDL 83 05/24/2022 07:56 AM    HDL 39 (A) 05/24/2022 07:56 AM    TRIGLYCERIDE 162 (H) 05/24/2022 07:56 AM      ROS:   Gen: no fevers/chills, no changes in weight  Eyes: no changes in vision  ENT: no sore throat, no hearing loss, no bloody nose  Pulm: no sob, no cough  CV: no chest pain, no palpitations  GI: no nausea/vomiting, no diarrhea  : no dysuria  MSk: no myalgias  Skin: no rash  Neuro: no headaches, no numbness/tingling  Heme/Lymph: no easy bruising        - NOTE: All other systems reviewed and are negative, except as in HPI.    Objective:     Exam: /70 (BP Location: Right arm, Patient Position: Sitting, BP Cuff Size: Adult)   Pulse 80   Temp 36.1 °C (96.9 °F)   Resp 14   Ht 1.88 m (6' 2\")   Wt 109 kg (241 lb)   SpO2 96%  Body mass index is 30.94 kg/m².    Constitutional: Alert, no distress, well-groomed.  Skin: Warm, dry, good turgor, no rashes in visible areas.  Eye: Equal, round and reactive, conjunctiva clear, lids " normal.  ENMT: Lips without lesions, good dentition, moist mucous membranes.  Neck: Trachea midline, no masses, no thyromegaly.  Respiratory: Unlabored respiratory effort, no cough.  MSK: Normal gait, moves all extremities.  Neuro: Grossly non-focal.   Psych: Alert and oriented x3, normal affect and mood.    Assessment & Plan:     68 y.o. male with the following -     1. Essential hypertension  Chronic, uncontrolled condition. Will add HCTZ 12.5 mg daily. Labs as indicated. Continue antihypertensive medications.  Discussed decreasing salt intake.  Emphasized benefits of exercise and diet.  - CBC WITH DIFFERENTIAL; Future  - TSH WITH REFLEX TO FT4; Future  - hydroCHLOROthiazide 12.5 MG tablet; Take 1 Tablet by mouth every day.  Dispense: 30 Tablet; Refill: 6    2. Mixed hyperlipidemia  Chronic condition. Labs as indicated.  Continue statin medication and lifestyle modifications.  - Comp Metabolic Panel; Future  - Lipid Profile; Future    3. Vitamin D deficiency  Labs ordered.   - VITAMIN D,25 HYDROXY (DEFICIENCY); Future

## 2023-11-21 NOTE — ASSESSMENT & PLAN NOTE
Chronic, uncontrolled condition. Currently taking Losartan 50 mg BID as directed. Was increased to twice daily at last appointment about a month ago.   He is not taking baby aspirin daily.   He is monitoring BP at home. Most readings are in the 160s/90s.   Denies symptoms low BP: light-headed, tunnel-vision, unusual fatigue.   Denies symptoms high BP:pounding headache, visual changes, palpitations, flushed face.   Denies medicine side effects: unusual fatigue, slow heartbeat, foot/leg swelling, cough.

## 2023-11-21 NOTE — ASSESSMENT & PLAN NOTE
Chronic condition. Due for labs. Patient currently taking simvastatin nightly. The 10-year ASCVD risk score (Tessa NICHOLSON, et al., 2019) is: 15.3%.  Denies chest pain, shortness of breath, heart palpitations.    Lab Results   Component Value Date/Time    CHOLSTRLTOT 154 05/24/2022 07:56 AM    LDL 83 05/24/2022 07:56 AM    HDL 39 (A) 05/24/2022 07:56 AM    TRIGLYCERIDE 162 (H) 05/24/2022 07:56 AM

## 2024-01-25 DIAGNOSIS — I10 ESSENTIAL HYPERTENSION: ICD-10-CM

## 2024-01-26 NOTE — TELEPHONE ENCOUNTER
Received request via: Pharmacy    Was the patient seen in the last year in this department? Yes    Does the patient have an active prescription (recently filled or refills available) for medication(s) requested? No    Pharmacy Name: [facility]    Does the patient have alf Plus and need 100 day supply (blood pressure, diabetes and cholesterol meds only)? Patient does not have SCP

## 2024-01-29 PROCEDURE — RXMED WILLOW AMBULATORY MEDICATION CHARGE: Performed by: NURSE PRACTITIONER

## 2024-01-29 RX ORDER — LOSARTAN POTASSIUM 50 MG/1
50 TABLET ORAL
Qty: 180 TABLET | Refills: 3 | Status: SHIPPED | OUTPATIENT
Start: 2024-01-29

## 2024-02-02 ENCOUNTER — PHARMACY VISIT (OUTPATIENT)
Dept: PHARMACY | Facility: MEDICAL CENTER | Age: 69
End: 2024-02-02
Payer: COMMERCIAL

## 2024-04-29 PROCEDURE — RXMED WILLOW AMBULATORY MEDICATION CHARGE: Performed by: NURSE PRACTITIONER

## 2024-05-02 ENCOUNTER — PHARMACY VISIT (OUTPATIENT)
Dept: PHARMACY | Facility: MEDICAL CENTER | Age: 69
End: 2024-05-02
Payer: COMMERCIAL

## 2024-06-20 DIAGNOSIS — I10 ESSENTIAL HYPERTENSION: ICD-10-CM

## 2024-06-21 NOTE — TELEPHONE ENCOUNTER
Received request via: Pharmacy    Was the patient seen in the last year in this department? Yes    Does the patient have an active prescription (recently filled or refills available) for medication(s) requested? No    Pharmacy Name: [facility]     Does the patient have prison Plus and need 100 day supply (blood pressure, diabetes and cholesterol meds only)? Patient does not have SCP

## 2024-06-24 RX ORDER — HYDROCHLOROTHIAZIDE 12.5 MG/1
12.5 TABLET ORAL DAILY
Qty: 90 TABLET | Refills: 0 | Status: SHIPPED | OUTPATIENT
Start: 2024-06-24

## 2024-08-01 PROCEDURE — RXMED WILLOW AMBULATORY MEDICATION CHARGE: Performed by: NURSE PRACTITIONER

## 2024-08-02 ENCOUNTER — PHARMACY VISIT (OUTPATIENT)
Dept: PHARMACY | Facility: MEDICAL CENTER | Age: 69
End: 2024-08-02
Payer: COMMERCIAL

## 2024-08-22 DIAGNOSIS — E78.5 HYPERLIPIDEMIA, UNSPECIFIED HYPERLIPIDEMIA TYPE: ICD-10-CM

## 2024-08-22 RX ORDER — SIMVASTATIN 40 MG
40 TABLET ORAL EVERY EVENING
Qty: 90 TABLET | Refills: 0 | Status: SHIPPED | OUTPATIENT
Start: 2024-08-22

## 2024-08-22 NOTE — TELEPHONE ENCOUNTER
Received request via: Pharmacy    Was the patient seen in the last year in this department? No    Does the patient have an active prescription (recently filled or refills available) for medication(s) requested? No    Pharmacy Name: [facility]     Does the patient have [facility] Plus and need 100-day supply? (This applies to ALL medications) Patient does not have SCP

## 2024-10-03 ENCOUNTER — IMMUNIZATION (OUTPATIENT)
Dept: OCCUPATIONAL MEDICINE | Facility: CLINIC | Age: 69
End: 2024-10-03

## 2024-10-03 DIAGNOSIS — Z23 NEED FOR VACCINATION: Primary | ICD-10-CM

## 2024-10-03 PROCEDURE — 90682 RIV4 VACC RECOMBINANT DNA IM: CPT | Performed by: PREVENTIVE MEDICINE

## 2024-11-18 PROCEDURE — RXMED WILLOW AMBULATORY MEDICATION CHARGE: Performed by: NURSE PRACTITIONER

## 2024-11-19 ENCOUNTER — PHARMACY VISIT (OUTPATIENT)
Dept: PHARMACY | Facility: MEDICAL CENTER | Age: 69
End: 2024-11-19
Payer: COMMERCIAL

## 2024-11-21 DIAGNOSIS — E78.5 HYPERLIPIDEMIA, UNSPECIFIED HYPERLIPIDEMIA TYPE: ICD-10-CM

## 2024-11-21 RX ORDER — SIMVASTATIN 40 MG
40 TABLET ORAL EVERY EVENING
Qty: 90 TABLET | Refills: 0 | Status: SHIPPED | OUTPATIENT
Start: 2024-11-21

## 2024-11-21 NOTE — TELEPHONE ENCOUNTER
Received request via: Pharmacy    Was the patient seen in the last year in this department? No  LOV : 11/21/2023   Does the patient have an active prescription (recently filled or refills available) for medication(s) requested? No    Pharmacy Name: [facility]     Does the patient have [facility] Plus and need 100-day supply? (This applies to ALL medications) Patient does not have SCP

## 2025-02-03 DIAGNOSIS — I10 ESSENTIAL HYPERTENSION: ICD-10-CM

## 2025-02-03 DIAGNOSIS — E55.9 VITAMIN D DEFICIENCY: ICD-10-CM

## 2025-02-03 DIAGNOSIS — E78.2 MIXED HYPERLIPIDEMIA: ICD-10-CM

## 2025-02-19 ENCOUNTER — HOSPITAL ENCOUNTER (OUTPATIENT)
Facility: MEDICAL CENTER | Age: 70
End: 2025-02-19
Attending: NURSE PRACTITIONER
Payer: COMMERCIAL

## 2025-02-19 DIAGNOSIS — E78.2 MIXED HYPERLIPIDEMIA: ICD-10-CM

## 2025-02-19 DIAGNOSIS — I10 ESSENTIAL HYPERTENSION: ICD-10-CM

## 2025-02-19 DIAGNOSIS — E55.9 VITAMIN D DEFICIENCY: ICD-10-CM

## 2025-02-19 LAB
25(OH)D3 SERPL-MCNC: 21 NG/ML (ref 30–100)
ALBUMIN SERPL BCP-MCNC: 4.2 G/DL (ref 3.2–4.9)
ALBUMIN/GLOB SERPL: 1.4 G/DL
ALP SERPL-CCNC: 92 U/L (ref 30–99)
ALT SERPL-CCNC: 19 U/L (ref 2–50)
ANION GAP SERPL CALC-SCNC: 9 MMOL/L (ref 7–16)
AST SERPL-CCNC: 22 U/L (ref 12–45)
BASOPHILS # BLD AUTO: 1.3 % (ref 0–1.8)
BASOPHILS # BLD: 0.06 K/UL (ref 0–0.12)
BILIRUB SERPL-MCNC: 0.9 MG/DL (ref 0.1–1.5)
BUN SERPL-MCNC: 16 MG/DL (ref 8–22)
CALCIUM ALBUM COR SERPL-MCNC: 9.4 MG/DL (ref 8.5–10.5)
CALCIUM SERPL-MCNC: 9.6 MG/DL (ref 8.5–10.5)
CHLORIDE SERPL-SCNC: 106 MMOL/L (ref 96–112)
CHOLEST SERPL-MCNC: 159 MG/DL (ref 100–199)
CO2 SERPL-SCNC: 27 MMOL/L (ref 20–33)
CREAT SERPL-MCNC: 1.09 MG/DL (ref 0.5–1.4)
EOSINOPHIL # BLD AUTO: 0.13 K/UL (ref 0–0.51)
EOSINOPHIL NFR BLD: 2.9 % (ref 0–6.9)
ERYTHROCYTE [DISTWIDTH] IN BLOOD BY AUTOMATED COUNT: 41.8 FL (ref 35.9–50)
FASTING STATUS PATIENT QL REPORTED: NORMAL
GFR SERPLBLD CREATININE-BSD FMLA CKD-EPI: 74 ML/MIN/1.73 M 2
GLOBULIN SER CALC-MCNC: 2.9 G/DL (ref 1.9–3.5)
GLUCOSE SERPL-MCNC: 100 MG/DL (ref 65–99)
HCT VFR BLD AUTO: 46.1 % (ref 42–52)
HDLC SERPL-MCNC: 41 MG/DL
HGB BLD-MCNC: 15.3 G/DL (ref 14–18)
IMM GRANULOCYTES # BLD AUTO: 0.02 K/UL (ref 0–0.11)
IMM GRANULOCYTES NFR BLD AUTO: 0.4 % (ref 0–0.9)
LDLC SERPL CALC-MCNC: 96 MG/DL
LYMPHOCYTES # BLD AUTO: 1.16 K/UL (ref 1–4.8)
LYMPHOCYTES NFR BLD: 25.6 % (ref 22–41)
MCH RBC QN AUTO: 30.7 PG (ref 27–33)
MCHC RBC AUTO-ENTMCNC: 33.2 G/DL (ref 32.3–36.5)
MCV RBC AUTO: 92.4 FL (ref 81.4–97.8)
MONOCYTES # BLD AUTO: 0.31 K/UL (ref 0–0.85)
MONOCYTES NFR BLD AUTO: 6.8 % (ref 0–13.4)
NEUTROPHILS # BLD AUTO: 2.86 K/UL (ref 1.82–7.42)
NEUTROPHILS NFR BLD: 63 % (ref 44–72)
NRBC # BLD AUTO: 0 K/UL
NRBC BLD-RTO: 0 /100 WBC (ref 0–0.2)
PLATELET # BLD AUTO: 211 K/UL (ref 164–446)
PMV BLD AUTO: 10.4 FL (ref 9–12.9)
POTASSIUM SERPL-SCNC: 4.5 MMOL/L (ref 3.6–5.5)
PROT SERPL-MCNC: 7.1 G/DL (ref 6–8.2)
RBC # BLD AUTO: 4.99 M/UL (ref 4.7–6.1)
SODIUM SERPL-SCNC: 142 MMOL/L (ref 135–145)
TRIGL SERPL-MCNC: 112 MG/DL (ref 0–149)
TSH SERPL DL<=0.005 MIU/L-ACNC: 2.29 UIU/ML (ref 0.38–5.33)
WBC # BLD AUTO: 4.5 K/UL (ref 4.8–10.8)

## 2025-02-19 PROCEDURE — 85025 COMPLETE CBC W/AUTO DIFF WBC: CPT

## 2025-02-19 PROCEDURE — 36415 COLL VENOUS BLD VENIPUNCTURE: CPT

## 2025-02-19 PROCEDURE — 82306 VITAMIN D 25 HYDROXY: CPT

## 2025-02-19 PROCEDURE — 80061 LIPID PANEL: CPT

## 2025-02-19 PROCEDURE — 80053 COMPREHEN METABOLIC PANEL: CPT

## 2025-02-19 PROCEDURE — 84443 ASSAY THYROID STIM HORMONE: CPT

## 2025-02-20 ENCOUNTER — OFFICE VISIT (OUTPATIENT)
Dept: MEDICAL GROUP | Facility: LAB | Age: 70
End: 2025-02-20
Payer: COMMERCIAL

## 2025-02-20 ENCOUNTER — APPOINTMENT (OUTPATIENT)
Dept: MEDICAL GROUP | Facility: LAB | Age: 70
End: 2025-02-20
Payer: COMMERCIAL

## 2025-02-20 ENCOUNTER — RESULTS FOLLOW-UP (OUTPATIENT)
Dept: MEDICAL GROUP | Facility: LAB | Age: 70
End: 2025-02-20

## 2025-02-20 VITALS
TEMPERATURE: 97.8 F | RESPIRATION RATE: 14 BRPM | OXYGEN SATURATION: 96 % | SYSTOLIC BLOOD PRESSURE: 132 MMHG | DIASTOLIC BLOOD PRESSURE: 76 MMHG | HEART RATE: 86 BPM | WEIGHT: 245.4 LBS | BODY MASS INDEX: 31.51 KG/M2

## 2025-02-20 DIAGNOSIS — Z12.11 SCREENING FOR COLORECTAL CANCER: ICD-10-CM

## 2025-02-20 DIAGNOSIS — I10 ESSENTIAL HYPERTENSION: ICD-10-CM

## 2025-02-20 DIAGNOSIS — N52.8 OTHER MALE ERECTILE DYSFUNCTION: ICD-10-CM

## 2025-02-20 DIAGNOSIS — B00.1 RECURRENT COLD SORES: ICD-10-CM

## 2025-02-20 DIAGNOSIS — R73.9 HYPERGLYCEMIA: ICD-10-CM

## 2025-02-20 DIAGNOSIS — E78.2 MIXED HYPERLIPIDEMIA: ICD-10-CM

## 2025-02-20 DIAGNOSIS — Z12.12 SCREENING FOR COLORECTAL CANCER: ICD-10-CM

## 2025-02-20 DIAGNOSIS — E55.9 VITAMIN D DEFICIENCY: ICD-10-CM

## 2025-02-20 PROCEDURE — 99214 OFFICE O/P EST MOD 30 MIN: CPT | Performed by: NURSE PRACTITIONER

## 2025-02-20 PROCEDURE — 3078F DIAST BP <80 MM HG: CPT | Performed by: NURSE PRACTITIONER

## 2025-02-20 PROCEDURE — RXMED WILLOW AMBULATORY MEDICATION CHARGE: Performed by: NURSE PRACTITIONER

## 2025-02-20 PROCEDURE — 3075F SYST BP GE 130 - 139MM HG: CPT | Performed by: NURSE PRACTITIONER

## 2025-02-20 RX ORDER — LOSARTAN POTASSIUM 50 MG/1
50 TABLET ORAL
Qty: 180 TABLET | Refills: 3 | Status: SHIPPED | OUTPATIENT
Start: 2025-02-20

## 2025-02-20 RX ORDER — ACYCLOVIR 800 MG/1
800 TABLET ORAL 2 TIMES DAILY
Qty: 20 TABLET | Refills: 3 | Status: SHIPPED | OUTPATIENT
Start: 2025-02-20

## 2025-02-20 RX ORDER — HYDROCHLOROTHIAZIDE 12.5 MG/1
12.5 TABLET ORAL DAILY
Qty: 90 TABLET | Refills: 3 | Status: SHIPPED | OUTPATIENT
Start: 2025-02-20

## 2025-02-20 RX ORDER — SIMVASTATIN 40 MG
40 TABLET ORAL EVERY EVENING
Qty: 90 TABLET | Refills: 3 | Status: SHIPPED | OUTPATIENT
Start: 2025-02-20

## 2025-02-20 RX ORDER — SILDENAFIL 100 MG/1
100 TABLET, FILM COATED ORAL
Qty: 10 TABLET | Refills: 6 | Status: SHIPPED | OUTPATIENT
Start: 2025-02-20

## 2025-02-20 ASSESSMENT — PATIENT HEALTH QUESTIONNAIRE - PHQ9: CLINICAL INTERPRETATION OF PHQ2 SCORE: 0

## 2025-02-20 ASSESSMENT — FIBROSIS 4 INDEX: FIB4 SCORE: 1.6

## 2025-02-20 NOTE — PROGRESS NOTES
Chief Complaint   Patient presents with    Lab Results     Verbal consent was acquired by the patient to use [employer] ambient listening note generation during this visit Yes      HPI:  History of Present Illness  The patient presents for review of lab results, elevated blood pressure, and medication management.    He has expressed a desire to reduce his sugar intake, acknowledging that he consumes excessive amounts of sugar, particularly from donuts and ice cream. He has been incorporating monk fruit into his diet as a sugar substitute. He admits to being overweight and recognizes the need for weight loss. He maintains an active lifestyle, walking over a mile each morning before work. He has been unsuccessful in scheduling a colonoscopy due to communication issues with the GI consultant's office. Approximately a year ago, he sustained an elbow fracture, which temporarily halted his workout routine. However, he reports that the fracture has since healed.    He has been monitoring his blood pressure regularly but ran out of hydrochlorothiazide and did not have any refills. He did not notice any significant changes in his blood pressure readings during this period. He is requesting a refill of hydrochlorothiazide to continue managing his blood pressure.    He is currently on simvastatin and acyclovir, the latter of which he does not require at present but wishes to keep on hand.    MEDICATIONS  Current: Simvastatin, hydrochlorothiazide, acyclovir    Results:  Results  Laboratory Studies  Kidney function is stable. Fasting blood sugar is slightly elevated at 100. Liver function is normal. Cholesterol levels are in the normal range. Thyroid level is normal. Vitamin D was slightly low.    ROS:  As documented in history of present illness above    Exam:   /76 (BP Location: Right arm, Patient Position: Sitting, BP Cuff Size: Adult)   Pulse 86   Temp 36.6 °C (97.8 °F)   Resp 14   Wt 111 kg (245 lb 6.4 oz)    SpO2 96%   BMI 31.51 kg/m²     Constitutional: Alert, no distress, well-groomed.  Skin: Warm, dry, good turgor, no rashes in visible areas.  Eye: Equal, round and reactive, conjunctiva clear, lids normal.  ENMT: Lips without lesions, good dentition, moist mucous membranes.  Neck: Trachea midline, no masses, no thyromegaly.  Respiratory: Unlabored respiratory effort, no cough.  MSK: Normal gait, moves all extremities.  Neuro: Grossly non-focal.   Psych: Alert and oriented x3, normal affect and mood.    Assessment / Plan:  Assessment & Plan  1. Essential hypertension  Well-controlled on current regimen.  Labs as indicated.  Continue antihypertensive medications.  Discussed decreasing salt intake.  Emphasized benefits of exercise and diet.  - losartan (COZAAR) 50 MG Tab; Take 1 Tablet by mouth 2 times a day.  Dispense: 180 Tablet; Refill: 3  - hydroCHLOROthiazide 12.5 MG tablet; Take 1 Tablet by mouth every day.  Dispense: 90 Tablet; Refill: 3    2. Hyperglycemia  Recommend to reduce sugar/carbohydrate/alcohol, eat more vegetables and lean meats such as fish/chicken/turkey. Recommend 30 minutes of cardiovascular exercise most days of the week.    3. Mixed hyperlipidemia  Chronic condition. Labs as indicated.  Continue statin medication and lifestyle modifications.  - simvastatin (ZOCOR) 40 MG Tab; Take 1 Tablet by mouth every evening.  Dispense: 90 Tablet; Refill: 3    4. Vitamin D deficiency  Discussed vitamin D supplementation. Recommended taking an over-the-counter vitamin D supplement daily (800-1000 IU).     5. Screening for colorectal cancer  Colonoscopy ordered.  - Referral to GI for Colonoscopy    6. Other male erectile dysfunction  This is chronic and stable problem.  Patient denies depressed mood or anxiety.  Discussed importance of tobacco cessation and avoiding heavy use of alcohol.  Continue erectile dysfunction medications and monitor.  - sildenafil citrate (VIAGRA) 100 MG tablet; Take 1 Tablet by mouth  1 time a day as needed for Erectile Dysfunction.  Dispense: 10 Tablet; Refill: 6    7. Recurrent cold sores  Chronic, stable condition. Patient to continue medication as prescribed.   - acyclovir (ZOVIRAX) 800 MG Tab; Take 1 Tablet by mouth 2 times a day.  Dispense: 20 Tablet; Refill: 3    Please note that this dictation was created using voice recognition software. I have made every reasonable attempt to correct obvious errors, but I expect that there are errors of grammar and possibly content that I did not discover before finalizing the note.

## 2025-02-21 ENCOUNTER — PHARMACY VISIT (OUTPATIENT)
Dept: PHARMACY | Facility: MEDICAL CENTER | Age: 70
End: 2025-02-21
Payer: COMMERCIAL

## 2025-03-19 ENCOUNTER — PATIENT MESSAGE (OUTPATIENT)
Dept: MEDICAL GROUP | Facility: LAB | Age: 70
End: 2025-03-19
Payer: COMMERCIAL

## 2025-03-19 DIAGNOSIS — Z12.11 COLON CANCER SCREENING: ICD-10-CM

## 2025-04-02 ENCOUNTER — OFFICE VISIT (OUTPATIENT)
Dept: SLEEP MEDICINE | Facility: MEDICAL CENTER | Age: 70
End: 2025-04-02
Attending: STUDENT IN AN ORGANIZED HEALTH CARE EDUCATION/TRAINING PROGRAM
Payer: COMMERCIAL

## 2025-04-02 VITALS
HEART RATE: 75 BPM | DIASTOLIC BLOOD PRESSURE: 86 MMHG | OXYGEN SATURATION: 93 % | WEIGHT: 239.6 LBS | BODY MASS INDEX: 30.75 KG/M2 | SYSTOLIC BLOOD PRESSURE: 134 MMHG | RESPIRATION RATE: 16 BRPM | HEIGHT: 74 IN

## 2025-04-02 DIAGNOSIS — G47.33 OSA (OBSTRUCTIVE SLEEP APNEA): Primary | ICD-10-CM

## 2025-04-02 PROCEDURE — 99212 OFFICE O/P EST SF 10 MIN: CPT | Performed by: STUDENT IN AN ORGANIZED HEALTH CARE EDUCATION/TRAINING PROGRAM

## 2025-04-02 PROCEDURE — 3079F DIAST BP 80-89 MM HG: CPT | Performed by: STUDENT IN AN ORGANIZED HEALTH CARE EDUCATION/TRAINING PROGRAM

## 2025-04-02 PROCEDURE — 3075F SYST BP GE 130 - 139MM HG: CPT | Performed by: STUDENT IN AN ORGANIZED HEALTH CARE EDUCATION/TRAINING PROGRAM

## 2025-04-02 PROCEDURE — 99213 OFFICE O/P EST LOW 20 MIN: CPT | Performed by: STUDENT IN AN ORGANIZED HEALTH CARE EDUCATION/TRAINING PROGRAM

## 2025-04-02 ASSESSMENT — FIBROSIS 4 INDEX: FIB4 SCORE: 1.6

## 2025-04-02 NOTE — PROGRESS NOTES
[facility] Follow-up Visit    CC: Follow-up regarding management of obstructive sleep apnea      HPI:  Tank Morse is a 69 y.o.male  with hyperlipidemia, hypertension, obesity, and obstructive sleep apnea on CPAP presents here to follow-up care management obstructive sleep apnea.    He is using his CPAP machine regularly.  Overall finds mask and pressures comfortable.  He has noted some fluctuation in his breathing events at night that the machine reports.  Otherwise has no acute concerns regarding the machine.    DME provider: [employer]   Device: SNAPCARD 2   Mask: hybrid fullface   Aerophagia: No  Snoring: No   Dry mouth: sometimes   Leak: No   Skin irritation: No   Chin strap: No      Sleep History  9/21/2022 HST showed severe obstructive sleep apnea overall DAMON 42.9, minimum oxygen saturation 62%, time at or below 88% saturation 2 hours 48 minutes.  10/5/2022 PSG titration study showed improvement in respiratory events with CPAP therapy.  Recommended 8-13 cmH2O    Patient Active Problem List    Diagnosis Date Noted    Hyperglycemia 02/20/2025    Other male erectile dysfunction 03/22/2023    Essential hypertension 02/09/2021    Hyperlipidemia 02/09/2021    Vitamin D deficiency 02/09/2021       Past Medical History:   Diagnosis Date    Chickenpox     GERD (gastroesophageal reflux disease)     Heartburn     Hyperlipidemia     Hypertension     Influenza     Mumps     Obesity     Ringing in ears     Snoring     Wears glasses         Past Surgical History:   Procedure Laterality Date    APPENDECTOMY      RHINOPLASTY      SINUSCOPE         Family History   Problem Relation Age of Onset    Alzheimer's Disease Father     Cancer Sister         Lymphoma       Social History     Socioeconomic History    Marital status:      Spouse name: Not on file    Number of children: Not on file    Years of education: Not on file    Highest education level: Not on file   Occupational History    Not on file    Tobacco Use    Smoking status: Former     Current packs/day: 0.00     Average packs/day: 1 pack/day for 16.3 years (16.3 ttl pk-yrs)     Types: Cigarettes, Pipe, Cigars     Start date: 1979     Quit date: 4/15/1995     Years since quittin.9    Smokeless tobacco: Never   Vaping Use    Vaping status: Never Used   Substance and Sexual Activity    Alcohol use: Not Currently    Drug use: Never    Sexual activity: Not Currently   Other Topics Concern    Not on file   Social History Narrative    Not on file     Social Drivers of Health     Financial Resource Strain: Not on file   Food Insecurity: Not on file   Transportation Needs: Not on file   Physical Activity: Inactive (2021)    [facility]    Exercise Vital Sign     Days of Exercise per Week: 0 days     Minutes of Exercise per Session: 0 min   Stress: Not on file   Social Connections: Not on file   Intimate Partner Violence: Not on file   Housing Stability: Not on file       Current Outpatient Medications   Medication Sig Dispense Refill    simvastatin (ZOCOR) 40 MG Tab Take 1 Tablet by mouth every evening. 90 Tablet 3    losartan (COZAAR) 50 MG Tab Take 1 Tablet by mouth 2 times a day. 180 Tablet 3    hydroCHLOROthiazide 12.5 MG tablet Take 1 Tablet by mouth every day. 90 Tablet 3    acyclovir (ZOVIRAX) 800 MG Tab Take 1 Tablet by mouth 2 times a day. 20 Tablet 3    triamcinolone acetonide (KENALOG) 0.5 % Cream Apply 1 Each topically 2 times a day. 30 g 3    sildenafil citrate (VIAGRA) 100 MG tablet Take 1 Tablet by mouth 1 time a day as needed for Erectile Dysfunction. 10 Tablet 6     No current facility-administered medications for this visit.        ALLERGIES: Patient has no known allergies.    ROS  Constitutional: Denies fevers, Denies weight changes  Ears/Nose/Throat/Mouth: Denies nasal congestion or sore throat   Cardiovascular: Denies chest pain  Respiratory: Denies shortness of breath,  "Denies cough  Gastrointestinal/Hepatic: Denies nausea, vomiting  Sleep: see HPI      PHYSICAL EXAM  /86 (BP Location: Left arm, Patient Position: Sitting, BP Cuff Size: Large adult)   Pulse 75   Resp 16   Ht 1.88 m (6' 2\")   Wt 109 kg (239 lb 9.6 oz)   SpO2 93%   BMI 30.76 kg/m²   Appearance: Well-nourished, well-developed, no acute distress  Eyes:  No scleral icterus , EOMI  Musculoskeletal:  Grossly normal; gait and station normal; digits and nails normal  Skin:  No rashes, petechiae, cyanosis  Neurologic: without focal signs; oriented to person, time, place, and purpose; judgement intact      Medical Decision Making   Assessment and Plan  Tank Morse is a 69 y.o.male  with hyperlipidemia, hypertension, obesity, and obstructive sleep apnea on CPAP presents here to follow-up care management obstructive sleep apnea.    The medical record was reviewed.    Obstructive sleep apnea  Compliance data reviewed showing 97% usage > 4hours in last 30  days. Average AHI 6.4 events/hour. Pt continues to use and benefit from machine.      Current Settings auto CPAP 10-15    Given slight elevation in events will given slight elevation in events will increase minimum pressure to 12 cmH2O.    PLAN:   -Order placed for mask and supplies   -Order placed regarding pressure increase new settings auto CPAP 12 to 15 cm water  -Advised to message us if events remain elevated  -Advised to reach out via MyChart with questions     Has been advised to continue the current CPAP, clean equipment frequently, and get new mask and supplies as allowed by insurance and DME. Recommend an earlier appointment, if significant treatment barriers develop.    Patients with STEPHAN are at increased risk of cardiovascular disease including coronary artery disease, systemic arterial hypertension, pulmonary arterial hypertension, cardiac arrythmias, and stroke.     Return in about 1 year (around 4/2/2026).      Please note portions of this " record was created using voice recognition software. I have made every reasonable attempt to correct obvious errors, but I expect that there are errors of grammar and possibly content I did not discover before finalizing the note.

## 2025-05-28 PROCEDURE — RXMED WILLOW AMBULATORY MEDICATION CHARGE: Performed by: NURSE PRACTITIONER

## 2025-05-30 ENCOUNTER — PHARMACY VISIT (OUTPATIENT)
Dept: PHARMACY | Facility: MEDICAL CENTER | Age: 70
End: 2025-05-30
Payer: COMMERCIAL